# Patient Record
Sex: MALE | Race: WHITE | NOT HISPANIC OR LATINO | Employment: OTHER | URBAN - METROPOLITAN AREA
[De-identification: names, ages, dates, MRNs, and addresses within clinical notes are randomized per-mention and may not be internally consistent; named-entity substitution may affect disease eponyms.]

---

## 2018-10-03 ENCOUNTER — HOSPITAL ENCOUNTER (INPATIENT)
Facility: HOSPITAL | Age: 63
LOS: 5 days | Discharge: HOME | DRG: 897 | End: 2018-10-08
Attending: EMERGENCY MEDICINE | Admitting: INTERNAL MEDICINE
Payer: MEDICAID

## 2018-10-03 DIAGNOSIS — F10.939 ALCOHOL WITHDRAWAL: Primary | ICD-10-CM

## 2018-10-03 DIAGNOSIS — R05.9 COUGH: ICD-10-CM

## 2018-10-03 LAB
ALBUMIN SERPL BCP-MCNC: 3.8 G/DL
ALP SERPL-CCNC: 121 U/L
ALT SERPL W/O P-5'-P-CCNC: 25 U/L
AMPHET+METHAMPHET UR QL: NEGATIVE
ANION GAP SERPL CALC-SCNC: 10 MMOL/L
APAP SERPL-MCNC: <3 UG/ML
AST SERPL-CCNC: 37 U/L
BACTERIA #/AREA URNS AUTO: NORMAL /HPF
BARBITURATES UR QL SCN>200 NG/ML: NEGATIVE
BASOPHILS # BLD AUTO: 0.06 K/UL
BASOPHILS NFR BLD: 0.8 %
BENZODIAZ UR QL SCN>200 NG/ML: NEGATIVE
BILIRUB SERPL-MCNC: 0.6 MG/DL
BILIRUB UR QL STRIP: NEGATIVE
BUN SERPL-MCNC: 9 MG/DL
BZE UR QL SCN: NORMAL
CALCIUM SERPL-MCNC: 9.5 MG/DL
CANNABINOIDS UR QL SCN: NEGATIVE
CHLORIDE SERPL-SCNC: 107 MMOL/L
CLARITY UR REFRACT.AUTO: CLEAR
CO2 SERPL-SCNC: 26 MMOL/L
COLOR UR AUTO: YELLOW
CREAT SERPL-MCNC: 0.7 MG/DL
CREAT UR-MCNC: 49 MG/DL
DIFFERENTIAL METHOD: ABNORMAL
EOSINOPHIL # BLD AUTO: 0.1 K/UL
EOSINOPHIL NFR BLD: 1 %
ERYTHROCYTE [DISTWIDTH] IN BLOOD BY AUTOMATED COUNT: 15 %
EST. GFR  (AFRICAN AMERICAN): >60 ML/MIN/1.73 M^2
EST. GFR  (NON AFRICAN AMERICAN): >60 ML/MIN/1.73 M^2
ETHANOL SERPL-MCNC: 119 MG/DL
ETHANOL SERPL-MCNC: 204 MG/DL
GLUCOSE SERPL-MCNC: 86 MG/DL
GLUCOSE UR QL STRIP: NEGATIVE
HCT VFR BLD AUTO: 38.2 %
HGB BLD-MCNC: 13.3 G/DL
HGB UR QL STRIP: NEGATIVE
HYALINE CASTS UR QL AUTO: 0 /LPF
IMM GRANULOCYTES # BLD AUTO: 0.02 K/UL
IMM GRANULOCYTES NFR BLD AUTO: 0.3 %
KETONES UR QL STRIP: NEGATIVE
LEUKOCYTE ESTERASE UR QL STRIP: NEGATIVE
LYMPHOCYTES # BLD AUTO: 1.4 K/UL
LYMPHOCYTES NFR BLD: 19.2 %
MCH RBC QN AUTO: 31.8 PG
MCHC RBC AUTO-ENTMCNC: 34.8 G/DL
MCV RBC AUTO: 91 FL
METHADONE UR QL SCN>300 NG/ML: NEGATIVE
MICROSCOPIC COMMENT: NORMAL
MONOCYTES # BLD AUTO: 0.6 K/UL
MONOCYTES NFR BLD: 7.9 %
NEUTROPHILS # BLD AUTO: 5.1 K/UL
NEUTROPHILS NFR BLD: 70.8 %
NITRITE UR QL STRIP: NEGATIVE
NRBC BLD-RTO: 0 /100 WBC
OPIATES UR QL SCN: NEGATIVE
PCP UR QL SCN>25 NG/ML: NEGATIVE
PH UR STRIP: 6 [PH] (ref 5–8)
PLATELET # BLD AUTO: 250 K/UL
PMV BLD AUTO: 9.3 FL
POTASSIUM SERPL-SCNC: 3.6 MMOL/L
PROT SERPL-MCNC: 7.7 G/DL
PROT UR QL STRIP: ABNORMAL
RBC # BLD AUTO: 4.18 M/UL
RBC #/AREA URNS AUTO: 0 /HPF (ref 0–4)
SODIUM SERPL-SCNC: 143 MMOL/L
SP GR UR STRIP: 1.01 (ref 1–1.03)
SQUAMOUS #/AREA URNS AUTO: 0 /HPF
TOXICOLOGY INFORMATION: NORMAL
TSH SERPL DL<=0.005 MIU/L-ACNC: 1.47 UIU/ML
URN SPEC COLLECT METH UR: ABNORMAL
UROBILINOGEN UR STRIP-ACNC: NEGATIVE EU/DL
WBC # BLD AUTO: 7.19 K/UL
WBC #/AREA URNS AUTO: 0 /HPF (ref 0–5)

## 2018-10-03 PROCEDURE — 84443 ASSAY THYROID STIM HORMONE: CPT

## 2018-10-03 PROCEDURE — 96365 THER/PROPH/DIAG IV INF INIT: CPT

## 2018-10-03 PROCEDURE — 96366 THER/PROPH/DIAG IV INF ADDON: CPT

## 2018-10-03 PROCEDURE — 80320 DRUG SCREEN QUANTALCOHOLS: CPT | Mod: 91

## 2018-10-03 PROCEDURE — 63600175 PHARM REV CODE 636 W HCPCS: Performed by: STUDENT IN AN ORGANIZED HEALTH CARE EDUCATION/TRAINING PROGRAM

## 2018-10-03 PROCEDURE — 80329 ANALGESICS NON-OPIOID 1 OR 2: CPT

## 2018-10-03 PROCEDURE — 80320 DRUG SCREEN QUANTALCOHOLS: CPT

## 2018-10-03 PROCEDURE — 81001 URINALYSIS AUTO W/SCOPE: CPT

## 2018-10-03 PROCEDURE — 94640 AIRWAY INHALATION TREATMENT: CPT

## 2018-10-03 PROCEDURE — 12000002 HC ACUTE/MED SURGE SEMI-PRIVATE ROOM

## 2018-10-03 PROCEDURE — 99285 EMERGENCY DEPT VISIT HI MDM: CPT | Mod: 25

## 2018-10-03 PROCEDURE — 94761 N-INVAS EAR/PLS OXIMETRY MLT: CPT

## 2018-10-03 PROCEDURE — 99284 EMERGENCY DEPT VISIT MOD MDM: CPT | Mod: ,,, | Performed by: EMERGENCY MEDICINE

## 2018-10-03 PROCEDURE — 80307 DRUG TEST PRSMV CHEM ANLYZR: CPT

## 2018-10-03 PROCEDURE — S4991 NICOTINE PATCH NONLEGEND: HCPCS | Performed by: STUDENT IN AN ORGANIZED HEALTH CARE EDUCATION/TRAINING PROGRAM

## 2018-10-03 PROCEDURE — 25000242 PHARM REV CODE 250 ALT 637 W/ HCPCS: Performed by: STUDENT IN AN ORGANIZED HEALTH CARE EDUCATION/TRAINING PROGRAM

## 2018-10-03 PROCEDURE — 85025 COMPLETE CBC W/AUTO DIFF WBC: CPT

## 2018-10-03 PROCEDURE — 80053 COMPREHEN METABOLIC PANEL: CPT

## 2018-10-03 PROCEDURE — 25000003 PHARM REV CODE 250: Performed by: STUDENT IN AN ORGANIZED HEALTH CARE EDUCATION/TRAINING PROGRAM

## 2018-10-03 RX ORDER — AMOXICILLIN 250 MG
1 CAPSULE ORAL 2 TIMES DAILY
Status: DISCONTINUED | OUTPATIENT
Start: 2018-10-03 | End: 2018-10-08 | Stop reason: HOSPADM

## 2018-10-03 RX ORDER — ONDANSETRON 2 MG/ML
4 INJECTION INTRAMUSCULAR; INTRAVENOUS EVERY 8 HOURS PRN
Status: DISCONTINUED | OUTPATIENT
Start: 2018-10-03 | End: 2018-10-08 | Stop reason: HOSPADM

## 2018-10-03 RX ORDER — CYCLOBENZAPRINE HCL 5 MG
5 TABLET ORAL 3 TIMES DAILY PRN
COMMUNITY

## 2018-10-03 RX ORDER — LOPERAMIDE HYDROCHLORIDE 2 MG/1
4 CAPSULE ORAL ONCE
Status: COMPLETED | OUTPATIENT
Start: 2018-10-03 | End: 2018-10-04

## 2018-10-03 RX ORDER — ONDANSETRON 4 MG/1
8 TABLET, ORALLY DISINTEGRATING ORAL EVERY 8 HOURS PRN
Status: DISCONTINUED | OUTPATIENT
Start: 2018-10-03 | End: 2018-10-08 | Stop reason: HOSPADM

## 2018-10-03 RX ORDER — IBUPROFEN 200 MG
1 TABLET ORAL DAILY
Status: DISCONTINUED | OUTPATIENT
Start: 2018-10-04 | End: 2018-10-03

## 2018-10-03 RX ORDER — IBUPROFEN 800 MG/1
800 TABLET ORAL 3 TIMES DAILY
COMMUNITY

## 2018-10-03 RX ORDER — GABAPENTIN 600 MG/1
600 TABLET ORAL 3 TIMES DAILY
Status: ON HOLD | COMMUNITY
End: 2018-10-08 | Stop reason: SDUPTHER

## 2018-10-03 RX ORDER — THIAMINE HCL 100 MG
100 TABLET ORAL DAILY
Status: DISCONTINUED | OUTPATIENT
Start: 2018-10-04 | End: 2018-10-08 | Stop reason: HOSPADM

## 2018-10-03 RX ORDER — SERTRALINE HYDROCHLORIDE 100 MG/1
100 TABLET, FILM COATED ORAL DAILY
COMMUNITY

## 2018-10-03 RX ORDER — OXCARBAZEPINE 600 MG/1
600 TABLET, FILM COATED ORAL 2 TIMES DAILY
COMMUNITY

## 2018-10-03 RX ORDER — ALBUTEROL SULFATE 0.83 MG/ML
2.5 SOLUTION RESPIRATORY (INHALATION)
Status: COMPLETED | OUTPATIENT
Start: 2018-10-03 | End: 2018-10-03

## 2018-10-03 RX ORDER — SODIUM CHLORIDE 0.9 % (FLUSH) 0.9 %
5 SYRINGE (ML) INJECTION
Status: DISCONTINUED | OUTPATIENT
Start: 2018-10-03 | End: 2018-10-08 | Stop reason: HOSPADM

## 2018-10-03 RX ORDER — OMEPRAZOLE 40 MG/1
40 CAPSULE, DELAYED RELEASE ORAL DAILY
COMMUNITY

## 2018-10-03 RX ORDER — ACETAMINOPHEN 325 MG/1
650 TABLET ORAL EVERY 4 HOURS PRN
Status: DISCONTINUED | OUTPATIENT
Start: 2018-10-03 | End: 2018-10-08 | Stop reason: HOSPADM

## 2018-10-03 RX ORDER — IBUPROFEN 200 MG
16 TABLET ORAL
Status: DISCONTINUED | OUTPATIENT
Start: 2018-10-03 | End: 2018-10-08 | Stop reason: HOSPADM

## 2018-10-03 RX ORDER — AMLODIPINE BESYLATE 5 MG/1
5 TABLET ORAL DAILY
COMMUNITY

## 2018-10-03 RX ORDER — IBUPROFEN 200 MG
1 TABLET ORAL DAILY
Status: DISCONTINUED | OUTPATIENT
Start: 2018-10-03 | End: 2018-10-08 | Stop reason: HOSPADM

## 2018-10-03 RX ORDER — FOLIC ACID 1 MG/1
1 TABLET ORAL DAILY
Status: DISCONTINUED | OUTPATIENT
Start: 2018-10-04 | End: 2018-10-08 | Stop reason: HOSPADM

## 2018-10-03 RX ORDER — GLUCAGON 1 MG
1 KIT INJECTION
Status: DISCONTINUED | OUTPATIENT
Start: 2018-10-03 | End: 2018-10-08 | Stop reason: HOSPADM

## 2018-10-03 RX ORDER — LORAZEPAM 1 MG/1
2 TABLET ORAL EVERY 4 HOURS PRN
Status: DISCONTINUED | OUTPATIENT
Start: 2018-10-03 | End: 2018-10-08

## 2018-10-03 RX ORDER — LISINOPRIL 10 MG/1
10 TABLET ORAL DAILY
Status: ON HOLD | COMMUNITY
End: 2018-10-08 | Stop reason: HOSPADM

## 2018-10-03 RX ORDER — IPRATROPIUM BROMIDE AND ALBUTEROL SULFATE 2.5; .5 MG/3ML; MG/3ML
3 SOLUTION RESPIRATORY (INHALATION) EVERY 6 HOURS PRN
Status: DISCONTINUED | OUTPATIENT
Start: 2018-10-03 | End: 2018-10-08 | Stop reason: HOSPADM

## 2018-10-03 RX ORDER — DIAZEPAM 5 MG/1
5 TABLET ORAL EVERY 8 HOURS
Status: DISCONTINUED | OUTPATIENT
Start: 2018-10-03 | End: 2018-10-05

## 2018-10-03 RX ORDER — RAMELTEON 8 MG/1
8 TABLET ORAL NIGHTLY PRN
Status: DISCONTINUED | OUTPATIENT
Start: 2018-10-03 | End: 2018-10-08 | Stop reason: HOSPADM

## 2018-10-03 RX ORDER — IBUPROFEN 200 MG
24 TABLET ORAL
Status: DISCONTINUED | OUTPATIENT
Start: 2018-10-03 | End: 2018-10-08 | Stop reason: HOSPADM

## 2018-10-03 RX ADMIN — THIAMINE HYDROCHLORIDE 100 MG: 100 INJECTION, SOLUTION INTRAMUSCULAR; INTRAVENOUS at 08:10

## 2018-10-03 RX ADMIN — SODIUM CHLORIDE 500 ML: 0.9 INJECTION, SOLUTION INTRAVENOUS at 08:10

## 2018-10-03 RX ADMIN — NICOTINE 1 PATCH: 14 PATCH, EXTENDED RELEASE TRANSDERMAL at 08:10

## 2018-10-03 RX ADMIN — ALBUTEROL SULFATE 2.5 MG: 2.5 SOLUTION RESPIRATORY (INHALATION) at 07:10

## 2018-10-03 NOTE — ED TRIAGE NOTES
"Patient arrives with  due to "problems with alcohol" requests detox from alcohol. States has been in detox 3 times. Recent emesis and shaking. Drinks 1/5 vodka daily. Drank 1 pint of vodka at 1200 today.   "

## 2018-10-03 NOTE — ED PROVIDER NOTES
Patient is a 63 year old male with PMHX of HTN, depression, alcohol abuse, and hx of SI attempt. He presents to the ED for alcohol intoxication. Patient is accompanied by Ms. Marcia Salazar [phone]  of [facility]. Patient reports having bilateral arms shaking, nausea, vomiting, and abdominal pain. Describes pain as constant and cramping. Rates pain 7/10. Reports last consuming alcohol two hours ago, reports drinking half of pint of vodka. Reports taking 6-600 mg gabapentin and 800 mg ibuprofen with alcohol. Reports drinking to take the pain away. Denies SI, but states there is a possibility of his feelings escalating and he being a danger to himself and others. Hx of alcohol detox three times. Denies SI, HI, or AVH. He denies fever,chills, sob, chest pain, dysuria, diarrhea, or constipation. He is a current everyday smoker and reports excessive alcohol use. Tianna (friend) [phone] and [phone].     I initially evaluated this patient and ordered a workup while in intake.  The patient will receive further evaluation in an ED pod when space is available.  All results from tests ordered in intake will not be followed by the intake team, including myself. All results will be followed by the ED Pod team.    I have discussed and reviewed with my supervising physician.     Codi Joseph PA-C  10/03/18 6236

## 2018-10-03 NOTE — ED NOTES
Patient states he took 6 600 mg Gabapentin at once at 1100 along with Vodka. Also took Ibuprofen 800 mg (4) at 0300. Denies suicidal or homicidal ideations. Denies seizure activity with prior detox.

## 2018-10-03 NOTE — ED NOTES
Patient identifiers verified and correct for Mr Antonio  C/C: Detox from alcohol  APPEARANCE: awake and alert in NAD. Speech garbled and slurred PTA  SKIN: warm, dry and intact. No breakdown or bruising.  MUSCULOSKELETAL: Patient moving all extremities spontaneously, no obvious swelling or deformities noted. Ambulates independently.  RESPIRATORY: Denies shortness of breath.Respirations unlabored.   CARDIAC: Denies CP, 2+ distal pulses; no peripheral edema  ABDOMEN: S/ND/NT, Positive nausea, poor appetite, positive vomiting today  : voids spontaneously, denies difficulty  Neurologic: AAO x 4; follows commands equal strength in all extremities; denies numbness/tingling. Denies dizziness Not able to sleep,

## 2018-10-03 NOTE — ED NOTES
Report received from Chante Dodson RN   Patient is accompanied with sitter for safety at the bedside.   The patient is awake, alert and acting age appropriately. Family at bedside.    No apparent distress noted. Airway is open and patent.  Respirations with normal effort and rate noted. Explanation of care provided to family and patient. No needs at this time. Will continue to monitor.

## 2018-10-04 PROBLEM — Z72.0 TOBACCO ABUSE: Status: ACTIVE | Noted: 2018-10-04

## 2018-10-04 PROBLEM — F32.9 MAJOR DEPRESSIVE DISORDER WITH SINGLE EPISODE: Status: ACTIVE | Noted: 2018-10-04

## 2018-10-04 PROBLEM — I10 HTN (HYPERTENSION): Status: ACTIVE | Noted: 2018-10-04

## 2018-10-04 LAB
BASOPHILS # BLD AUTO: 0.04 K/UL
BASOPHILS NFR BLD: 0.6 %
DIFFERENTIAL METHOD: ABNORMAL
EOSINOPHIL # BLD AUTO: 0.1 K/UL
EOSINOPHIL NFR BLD: 1.2 %
ERYTHROCYTE [DISTWIDTH] IN BLOOD BY AUTOMATED COUNT: 14.6 %
HCT VFR BLD AUTO: 38.8 %
HGB BLD-MCNC: 13.4 G/DL
IMM GRANULOCYTES # BLD AUTO: 0.02 K/UL
IMM GRANULOCYTES NFR BLD AUTO: 0.3 %
LYMPHOCYTES # BLD AUTO: 0.7 K/UL
LYMPHOCYTES NFR BLD: 10.1 %
MCH RBC QN AUTO: 31.5 PG
MCHC RBC AUTO-ENTMCNC: 34.5 G/DL
MCV RBC AUTO: 91 FL
MONOCYTES # BLD AUTO: 0.6 K/UL
MONOCYTES NFR BLD: 9.1 %
NEUTROPHILS # BLD AUTO: 5.5 K/UL
NEUTROPHILS NFR BLD: 78.7 %
NRBC BLD-RTO: 0 /100 WBC
PLATELET # BLD AUTO: 223 K/UL
PMV BLD AUTO: 9.4 FL
RBC # BLD AUTO: 4.26 M/UL
WBC # BLD AUTO: 6.92 K/UL

## 2018-10-04 PROCEDURE — 25000003 PHARM REV CODE 250: Performed by: STUDENT IN AN ORGANIZED HEALTH CARE EDUCATION/TRAINING PROGRAM

## 2018-10-04 PROCEDURE — 85025 COMPLETE CBC W/AUTO DIFF WBC: CPT

## 2018-10-04 PROCEDURE — 99239 HOSP IP/OBS DSCHRG MGMT >30: CPT | Mod: ,,, | Performed by: INTERNAL MEDICINE

## 2018-10-04 PROCEDURE — 36415 COLL VENOUS BLD VENIPUNCTURE: CPT

## 2018-10-04 PROCEDURE — 63600175 PHARM REV CODE 636 W HCPCS: Performed by: STUDENT IN AN ORGANIZED HEALTH CARE EDUCATION/TRAINING PROGRAM

## 2018-10-04 PROCEDURE — 11000001 HC ACUTE MED/SURG PRIVATE ROOM

## 2018-10-04 PROCEDURE — S4991 NICOTINE PATCH NONLEGEND: HCPCS | Performed by: STUDENT IN AN ORGANIZED HEALTH CARE EDUCATION/TRAINING PROGRAM

## 2018-10-04 RX ORDER — MULTIVITAMIN
1 TABLET ORAL DAILY
COMMUNITY

## 2018-10-04 RX ORDER — AMLODIPINE BESYLATE 5 MG/1
5 TABLET ORAL DAILY
Status: DISCONTINUED | OUTPATIENT
Start: 2018-10-04 | End: 2018-10-08 | Stop reason: HOSPADM

## 2018-10-04 RX ORDER — SERTRALINE HYDROCHLORIDE 100 MG/1
100 TABLET, FILM COATED ORAL DAILY
Status: DISCONTINUED | OUTPATIENT
Start: 2018-10-04 | End: 2018-10-08 | Stop reason: HOSPADM

## 2018-10-04 RX ORDER — ENOXAPARIN SODIUM 100 MG/ML
40 INJECTION SUBCUTANEOUS EVERY 24 HOURS
Status: DISCONTINUED | OUTPATIENT
Start: 2018-10-04 | End: 2018-10-08 | Stop reason: HOSPADM

## 2018-10-04 RX ORDER — BENZONATATE 100 MG/1
100 CAPSULE ORAL 3 TIMES DAILY PRN
Status: DISCONTINUED | OUTPATIENT
Start: 2018-10-04 | End: 2018-10-08 | Stop reason: HOSPADM

## 2018-10-04 RX ORDER — LISINOPRIL 10 MG/1
10 TABLET ORAL DAILY
Status: DISCONTINUED | OUTPATIENT
Start: 2018-10-04 | End: 2018-10-04

## 2018-10-04 RX ORDER — FLUTICASONE PROPIONATE 50 MCG
2 SPRAY, SUSPENSION (ML) NASAL DAILY
Status: DISCONTINUED | OUTPATIENT
Start: 2018-10-05 | End: 2018-10-08 | Stop reason: HOSPADM

## 2018-10-04 RX ORDER — LISINOPRIL 20 MG/1
20 TABLET ORAL DAILY
Status: DISCONTINUED | OUTPATIENT
Start: 2018-10-05 | End: 2018-10-06

## 2018-10-04 RX ORDER — AMLODIPINE BESYLATE 5 MG/1
5 TABLET ORAL DAILY
Status: DISCONTINUED | OUTPATIENT
Start: 2018-10-04 | End: 2018-10-04

## 2018-10-04 RX ORDER — ALBUTEROL SULFATE 90 UG/1
2 AEROSOL, METERED RESPIRATORY (INHALATION) EVERY 6 HOURS PRN
COMMUNITY

## 2018-10-04 RX ORDER — LISINOPRIL 10 MG/1
10 TABLET ORAL DAILY
Status: COMPLETED | OUTPATIENT
Start: 2018-10-04 | End: 2018-10-04

## 2018-10-04 RX ADMIN — Medication 100 MG: at 09:10

## 2018-10-04 RX ADMIN — ACETAMINOPHEN 650 MG: 325 TABLET ORAL at 08:10

## 2018-10-04 RX ADMIN — DIAZEPAM 5 MG: 5 TABLET ORAL at 05:10

## 2018-10-04 RX ADMIN — LISINOPRIL 10 MG: 10 TABLET ORAL at 06:10

## 2018-10-04 RX ADMIN — NICOTINE 1 PATCH: 14 PATCH, EXTENDED RELEASE TRANSDERMAL at 08:10

## 2018-10-04 RX ADMIN — ENOXAPARIN SODIUM 40 MG: 100 INJECTION SUBCUTANEOUS at 05:10

## 2018-10-04 RX ADMIN — LOPERAMIDE HYDROCHLORIDE 4 MG: 2 CAPSULE ORAL at 12:10

## 2018-10-04 RX ADMIN — DIAZEPAM 5 MG: 5 TABLET ORAL at 01:10

## 2018-10-04 RX ADMIN — DIAZEPAM 5 MG: 5 TABLET ORAL at 12:10

## 2018-10-04 RX ADMIN — LISINOPRIL 10 MG: 10 TABLET ORAL at 02:10

## 2018-10-04 RX ADMIN — FOLIC ACID 1 MG: 1 TABLET ORAL at 09:10

## 2018-10-04 RX ADMIN — THERA TABS 1 TABLET: TAB at 09:10

## 2018-10-04 RX ADMIN — IBUPROFEN 600 MG: 200 TABLET, FILM COATED ORAL at 04:10

## 2018-10-04 RX ADMIN — Medication 1 TABLET: at 09:10

## 2018-10-04 RX ADMIN — SENNOSIDES AND DOCUSATE SODIUM 1 TABLET: 8.6; 5 TABLET ORAL at 12:10

## 2018-10-04 RX ADMIN — SERTRALINE 100 MG: 100 TABLET, FILM COATED ORAL at 09:10

## 2018-10-04 RX ADMIN — AMLODIPINE BESYLATE 5 MG: 5 TABLET ORAL at 06:10

## 2018-10-04 RX ADMIN — SENNOSIDES AND DOCUSATE SODIUM 1 TABLET: 8.6; 5 TABLET ORAL at 10:10

## 2018-10-04 RX ADMIN — LORAZEPAM 2 MG: 1 TABLET ORAL at 08:10

## 2018-10-04 RX ADMIN — LORAZEPAM 2 MG: 1 TABLET ORAL at 03:10

## 2018-10-04 RX ADMIN — ACETAMINOPHEN 650 MG: 325 TABLET ORAL at 03:10

## 2018-10-04 RX ADMIN — SENNOSIDES AND DOCUSATE SODIUM 1 TABLET: 8.6; 5 TABLET ORAL at 09:10

## 2018-10-04 RX ADMIN — DIAZEPAM 5 MG: 5 TABLET ORAL at 10:10

## 2018-10-04 RX ADMIN — BENZONATATE 100 MG: 100 CAPSULE ORAL at 06:10

## 2018-10-04 NOTE — ED PROVIDER NOTES
Encounter Date: 10/3/2018       History     Chief Complaint   Patient presents with    Alcohol Intoxication     PT brought in by therapist. Pt brought in for detox and drank 1 pint today. PT has been shaking and vomiting.      Mr. Antonio is a 62 y/o M with PMHx of depression, HTN and prior SI who presents today requesting assistance detoxing from alcohol. He drank 1/2 of a fifth of vodka 2 hours prior to being seen in the ED. He also took 6 of his gabapentin and 4 ibuprofen pills for his neck pain. He denies attempting to harm himself by taking these pills. He states it did help his pain. He is complaning of headache and N/V. He states he gets GI upset, sweating, shakes when he tries to stop drinking. He has a hx of alcohol abuse, but has maintained sobriety for a period of years. He started drinking again about 2 weeks ago due to social stressors. He is now drinking to prevent detox s/s. He denies SI, HI, AH, VH.       The history is provided by the patient and medical records.     Review of patient's allergies indicates:  No Known Allergies  Past Medical History:   Diagnosis Date    Behavioral problem     Depression     Fatigue     History of psychiatric care     History of psychiatric hospitalization     Hypertension     Self-harming behavior     Suicide attempt      Past Surgical History:   Procedure Laterality Date    ABDOMINAL SURGERY       Family History   Problem Relation Age of Onset    Alcohol abuse Brother     Drug abuse Brother      Social History     Tobacco Use    Smoking status: Current Every Day Smoker     Packs/day: 1.00     Types: Cigarettes    Smokeless tobacco: Never Used   Substance Use Topics    Alcohol use: Yes     Alcohol/week: 7.0 oz     Types: 14 Standard drinks or equivalent per week     Comment: 5th of vodka daily     Drug use: No     Review of Systems   Constitutional: Positive for appetite change and diaphoresis. Negative for chills and fever.   HENT: Negative.          Pt has hoarse voice and slurred speech, but per  who accompanied pt to ED, this is pt's baseline.    Eyes: Negative.    Respiratory: Positive for cough and wheezing. Negative for choking, chest tightness, shortness of breath and stridor.    Cardiovascular: Negative for chest pain, palpitations and leg swelling.   Gastrointestinal: Positive for nausea and vomiting. Negative for abdominal distention, abdominal pain, constipation and diarrhea.   Endocrine: Negative.    Genitourinary: Negative.    Musculoskeletal: Negative.    Skin: Negative.    Allergic/Immunologic: Negative.    Neurological: Positive for tremors and headaches. Negative for dizziness, seizures, syncope, speech difficulty, weakness, light-headedness and numbness.   Hematological: Negative.    Psychiatric/Behavioral: Positive for sleep disturbance. Negative for agitation, behavioral problems, confusion, decreased concentration, dysphoric mood, hallucinations, self-injury and suicidal ideas. The patient is not nervous/anxious and is not hyperactive.        Physical Exam     Initial Vitals [10/03/18 1533]   BP Pulse Resp Temp SpO2   (!) 157/86 94 20 98.6 °F (37 °C) 95 %      MAP       --         Physical Exam    Nursing note and vitals reviewed.  Constitutional: He appears well-developed and well-nourished. He is not diaphoretic. No distress.   HENT:   Head: Normocephalic and atraumatic.   Eyes: Conjunctivae and EOM are normal. Pupils are equal, round, and reactive to light.   Neck: Normal range of motion. Neck supple.   Cardiovascular: Normal rate, regular rhythm, normal heart sounds and intact distal pulses.   Pulmonary/Chest: He has wheezes.   Abdominal: Soft. Bowel sounds are normal.   Musculoskeletal: Normal range of motion.   Neurological: He is alert and oriented to person, place, and time. He has normal strength. He displays tremor. GCS score is 15. GCS eye subscore is 4. GCS verbal subscore is 5. GCS motor subscore is 6.   Skin: Skin  "is warm and dry.   Psychiatric: He has a normal mood and affect. His behavior is normal. Judgment and thought content normal.         ED Course   Procedures  Labs Reviewed   CBC W/ AUTO DIFFERENTIAL - Abnormal; Notable for the following components:       Result Value    RBC 4.18 (*)     Hemoglobin 13.3 (*)     Hematocrit 38.2 (*)     MCH 31.8 (*)     RDW 15.0 (*)     All other components within normal limits    Narrative:     red used as one green    URINALYSIS, REFLEX TO URINE CULTURE - Abnormal; Notable for the following components:    Protein, UA 2+ (*)     All other components within normal limits    Narrative:     Preferred Collection Type->Urine, Clean Catch   ALCOHOL,MEDICAL (ETHANOL) - Abnormal; Notable for the following components:    Alcohol, Medical, Serum 204 (*)     All other components within normal limits    Narrative:     red used as one green    ACETAMINOPHEN LEVEL - Abnormal; Notable for the following components:    Acetaminophen (Tylenol), Serum <3.0 (*)     All other components within normal limits    Narrative:     red used as one green    ALCOHOL,MEDICAL (ETHANOL) - Abnormal; Notable for the following components:    Alcohol, Medical, Serum 119 (*)     All other components within normal limits   COMPREHENSIVE METABOLIC PANEL    Narrative:     red used as one green    TSH    Narrative:     red used as one green    ALCOHOL,MEDICAL (ETHANOL)   URINALYSIS MICROSCOPIC    Narrative:     Preferred Collection Type->Urine, Clean Catch   DRUG SCREEN PANEL, URINE EMERGENCY          Imaging Results          X-Ray Chest PA And Lateral (Final result)  Result time 10/03/18 21:02:32    Final result by Ricardo Weaver MD (10/03/18 21:02:32)                 Impression:      No acute cardiopulmonary finding.      Electronically signed by: Ricardo Weaver MD  Date:    10/03/2018  Time:    21:02             Narrative:    EXAMINATION:  XR CHEST PA AND LATERAL    CLINICAL HISTORY:  Provided history is "  " "Cough".    TECHNIQUE:  Frontal and lateral views of the chest were performed.    COMPARISON:  07/23/2013.    FINDINGS:  Cardiac silhouette is not enlarged. No focal consolidation.  No sizable pleural effusion.  No pneumothorax.  There are several old left-sided rib fractures.                                 Medical Decision Making:   Initial Assessment:   Mr. Antonio is a 62 y/o M with PMHx of depression, HTN and prior SI who presents today requesting assistance detoxing from alcohol. He drank 1/2 of a fifth of vodka 2 hours prior to being seen in the ED. He also took 6 of his gabapentin and 4 ibuprofen pills for his neck pain. He denies attempting to harm himself by taking these pills. He states it did help his pain. He is complaning of headache and N/V. He states he gets GI upset, sweating, shakes when he tries to stop drinking. He has a hx of alcohol abuse, but has maintained sobriety for a period of years. He started drinking again about 2 weeks ago due to social stressors. He is now drinking to prevent detox s/s. He denies SI, HI, AH, VH.   ED Management:  EtOH levels, CBC, CMP, EKG, thiamine and B12, 1L NS. Admit to hospital medicine.       APC / Resident Notes:   10:12 PM  Pt's CIWA is 12. Pt will be admitted to Dr. Sommer's service.         Attending Attestation:   Physician Attestation Statement for Resident:  As the supervising MD   Physician Attestation Statement: I have personally seen and examined this patient.   I agree with the above history. -:   As the supervising MD I agree with the above PE.    As the supervising MD I agree with the above treatment, course, plan, and disposition.                       Clinical Impression:   The primary encounter diagnosis was Alcohol withdrawal. A diagnosis of Cough was also pertinent to this visit.                             Suman Zamora MD  11/28/18 0817    "

## 2018-10-04 NOTE — SUBJECTIVE & OBJECTIVE
Interval History: Admitted overnight for ETOH withdrawal. Only required ativan 2mg PO x1 per CIWA.    Review of Systems  Objective:     Vital Signs (Most Recent):  Temp: 98 °F (36.7 °C) (10/04/18 1553)  Pulse: 99 (10/04/18 1607)  Resp: 20 (10/04/18 1553)  BP: (!) 164/90 (10/04/18 1553)  SpO2: 97 % (10/04/18 1553) Vital Signs (24h Range):  Temp:  [96.7 °F (35.9 °C)-98.6 °F (37 °C)] 98 °F (36.7 °C)  Pulse:  [72-99] 99  Resp:  [18-20] 20  SpO2:  [93 %-97 %] 97 %  BP: (164-182)/() 164/90     Weight: 71.7 kg (158 lb)  Body mass index is 22.67 kg/m².    Intake/Output Summary (Last 24 hours) at 10/4/2018 1853  Last data filed at 10/4/2018 1415  Gross per 24 hour   Intake 1200 ml   Output --   Net 1200 ml      Physical Exam   Constitutional: He is oriented to person, place, and time. He appears well-developed and well-nourished. No distress.   HENT:   Head: Normocephalic and atraumatic.   Eyes: Conjunctivae and EOM are normal. No scleral icterus.   Neck: Normal range of motion. Neck supple.   Cardiovascular: Normal rate, regular rhythm and normal heart sounds.   Pulmonary/Chest: Effort normal. He has no wheezes. He has no rales.   Abdominal: Soft. Bowel sounds are normal. He exhibits no distension. There is no tenderness.   Musculoskeletal: Normal range of motion. He exhibits no edema or tenderness.   Neurological: He is alert and oriented to person, place, and time.   Very mild tremor noted   Skin: Skin is warm and dry. No rash noted.   Psychiatric: He has a normal mood and affect. His behavior is normal.       Significant Labs:   CBC:   Recent Labs   Lab  10/03/18   1715  10/04/18   1201   WBC  7.19  6.92   HGB  13.3*  13.4*   HCT  38.2*  38.8*   PLT  250  223     CMP:   Recent Labs   Lab  10/03/18   1715   NA  143   K  3.6   CL  107   CO2  26   GLU  86   BUN  9   CREATININE  0.7   CALCIUM  9.5   PROT  7.7   ALBUMIN  3.8   BILITOT  0.6   ALKPHOS  121   AST  37   ALT  25   ANIONGAP  10   EGFRNONAA  >60.0        Significant Imaging: I have reviewed and interpreted all pertinent imaging results/findings within the past 24 hours.

## 2018-10-04 NOTE — ASSESSMENT & PLAN NOTE
-- Valium 5 mg Q8  -- Ativan with CIWA >8  -- Thiamin, folic acid multivitamin supplement daily.  -- Consider addiction psych consult in the morning.

## 2018-10-04 NOTE — PLAN OF CARE
Dajuan Sears MD     Payor: MEDICAID / Plan: [place] [employer] / Product Type: Managed Medicaid /      Extended Emergency Contact Information  Primary Emergency Contact: Akosua Gutierrez  Address: [facility] [place] [address] [facility]  Home Phone: [phone]  Relation: Friend        10/04/18 1303   Discharge Assessment   Assessment Type Discharge Planning Assessment   Confirmed/corrected address and phone number on facesheet? Yes   Assessment information obtained from? Patient   Expected Length of Stay (days) 3   Communicated expected length of stay with patient/caregiver yes   Prior to hospitilization cognitive status: Alert/Oriented   Prior to hospitalization functional status: Independent   Current cognitive status: Alert/Oriented   Current Functional Status: Needs Assistance   Able to Return to Prior Arrangements yes   Is patient able to care for self after discharge? Yes   Patient's perception of discharge disposition home or selfcare   Readmission Within The Last 30 Days no previous admission in last 30 days   Patient currently being followed by outpatient case management? No   Patient currently receives any other outside agency services? No   Equipment Currently Used at Home none   Do you have any problems affording any of your prescribed medications? No   Is the patient taking medications as prescribed? yes   Does the patient have transportation home? Yes   Transportation Available family or friend will provide   Does the patient receive services at the [facility]? No   Discharge Plan A Home   Discharge Plan B Home with family   Patient/Family In Agreement With Plan yes

## 2018-10-04 NOTE — SUBJECTIVE & OBJECTIVE
Past Medical History:   Diagnosis Date    Behavioral problem     Depression     Fatigue     History of psychiatric care     History of psychiatric hospitalization     Hypertension     Self-harming behavior     Suicide attempt        Past Surgical History:   Procedure Laterality Date    ABDOMINAL SURGERY         Review of patient's allergies indicates:  No Known Allergies    No current facility-administered medications on file prior to encounter.      Current Outpatient Medications on File Prior to Encounter   Medication Sig    amLODIPine (NORVASC) 5 MG tablet Take 5 mg by mouth once daily.    cyclobenzaprine (FLEXERIL) 5 MG tablet Take 5 mg by mouth 3 (three) times daily as needed for Muscle spasms.    gabapentin (NEURONTIN) 600 MG tablet Take 600 mg by mouth 3 (three) times daily.    ibuprofen (ADVIL,MOTRIN) 800 MG tablet Take 800 mg by mouth 3 (three) times daily.    lisinopril 10 MG tablet Take 10 mg by mouth once daily.    omeprazole (PRILOSEC) 40 MG capsule Take 40 mg by mouth once daily.    OXcarbazepine (TRILEPTAL) 600 MG Tab Take 150 mg by mouth 2 (two) times daily.    sertraline (ZOLOFT) 100 MG tablet Take 100 mg by mouth once daily.    pantoprazole (PROTONIX) 40 MG tablet Take 1 tablet (40 mg total) by mouth once daily.    propranolol (INDERAL) 20 MG tablet Take 1 tablet (20 mg total) by mouth 2 (two) times daily.     Family History     Problem Relation (Age of Onset)    Alcohol abuse Brother    Drug abuse Brother        Tobacco Use    Smoking status: Current Every Day Smoker     Packs/day: 1.00     Types: Cigarettes    Smokeless tobacco: Never Used   Substance and Sexual Activity    Alcohol use: Yes     Alcohol/week: 7.0 oz     Types: 14 Standard drinks or equivalent per week     Comment: 5th of vodka daily     Drug use: No    Sexual activity: Not Currently     Review of Systems   Constitutional: Negative for chills and fever.   HENT: Negative for congestion.    Eyes: Negative for  visual disturbance.   Respiratory: Positive for cough and shortness of breath.    Cardiovascular: Negative for chest pain, palpitations and leg swelling.   Gastrointestinal: Negative for abdominal distention, abdominal pain, diarrhea, nausea and vomiting.   Genitourinary: Negative for dysuria and hematuria.   Neurological: Negative for seizures, syncope and headaches.   Psychiatric/Behavioral: Negative for agitation and behavioral problems.     Objective:     Vital Signs (Most Recent):  Temp: 98.6 °F (37 °C) (10/04/18 0300)  Pulse: 72 (10/04/18 0300)  Resp: 20 (10/04/18 0300)  BP: (!) 182/100 (10/04/18 0300)  SpO2: 95 % (10/04/18 0300) Vital Signs (24h Range):  Temp:  [98.6 °F (37 °C)] 98.6 °F (37 °C)  Pulse:  [72-94] 72  Resp:  [20] 20  SpO2:  [94 %-95 %] 95 %  BP: (157-182)/() 182/100     Weight: 71.7 kg (158 lb)  Body mass index is 22.67 kg/m².    Physical Exam   Constitutional: He is oriented to person, place, and time. He appears well-developed and well-nourished.   HENT:   Head: Normocephalic and atraumatic.   Neck: Normal range of motion. Neck supple.   Cardiovascular: Normal rate, regular rhythm and normal heart sounds.   Pulmonary/Chest: Effort normal.   Abdominal: Soft. Bowel sounds are normal.   Musculoskeletal: He exhibits no edema.   Neurological: He is alert and oriented to person, place, and time.   Skin: Skin is warm and dry.   Psychiatric: He has a normal mood and affect. His behavior is normal.           Significant Labs: All pertinent labs within the past 24 hours have been reviewed.    Significant Imaging: I have reviewed all pertinent imaging results/findings within the past 24 hours.

## 2018-10-04 NOTE — PROGRESS NOTES
Pt admitted from ER in stretcher accompanied by 1 transporting staff in stable condition. AAOx4, calm and cooperative. Oriented to the room and the unit.

## 2018-10-04 NOTE — PLAN OF CARE
Problem: Fall Risk (Adult)  Goal: Identify Related Risk Factors and Signs and Symptoms  Related risk factors and signs and symptoms are identified upon initiation of Human Response Clinical Practice Guideline (CPG)  Outcome: Ongoing (interventions implemented as appropriate)   10/04/18 1645   Fall Risk   Related Risk Factors (Fall Risk) fatigue/slow reaction;homeostatic imbalance;polypharmacy;environment unfamiliar   Signs and Symptoms (Fall Risk) presence of risk factors     Goal: Absence of Falls  Patient will demonstrate the desired outcomes by discharge/transition of care.  Outcome: Ongoing (interventions implemented as appropriate)   10/04/18 1645   Fall Risk (Adult)   Absence of Falls making progress toward outcome

## 2018-10-04 NOTE — PROGRESS NOTES
[facility] Hospital Medicine  Progress Note    Patient Name: Giuliano Antonio  MRN: [id number]  Patient Class: IP- Inpatient   Admission Date: 10/3/2018  Length of Stay: 1 days  Attending Physician: Shona Sommer MD  Primary Care Provider: Dajuan Sears MD    [facility] Medicine Team: [facility]OSP MED 5 Phi Anthony MD    Subjective:     Principal Problem:Alcohol withdrawal    HPI:  Mr. Antonio is a 62 y/o M with PMHx of depression, HTN and prior SI who presents today requesting assistance detoxing from alcohol.     He drank 1/2 of a fifth of vodka 2 hours prior to being seen in the ED. He also took 6 of his gabapentin and 4 ibuprofen pills for his neck pain. He denies attempting to harm himself by taking these pills. He states it did help his pain. He is complaning of headache and N/V. He states he gets GI upset, sweating, shakes when he tries to stop drinking. He has a hx of alcohol abuse, but has maintained sobriety for a period of years. He started drinking again about 2 weeks ago due to social stressors. He is now drinking to prevent detox s/s. He denies SI, HI, AH, VH.         Hospital Course:  Pt was admitted to hospital medicine 10/03 for alcohol withdrawal.    Interval History: Admitted overnight for ETOH withdrawal. Only required ativan 2mg PO x1 per CIWA.    Review of Systems  Objective:     Vital Signs (Most Recent):  Temp: 98 °F (36.7 °C) (10/04/18 1553)  Pulse: 99 (10/04/18 1607)  Resp: 20 (10/04/18 1553)  BP: (!) 164/90 (10/04/18 1553)  SpO2: 97 % (10/04/18 1553) Vital Signs (24h Range):  Temp:  [96.7 °F (35.9 °C)-98.6 °F (37 °C)] 98 °F (36.7 °C)  Pulse:  [72-99] 99  Resp:  [18-20] 20  SpO2:  [93 %-97 %] 97 %  BP: (164-182)/() 164/90     Weight: 71.7 kg (158 lb)  Body mass index is 22.67 kg/m².    Intake/Output Summary (Last 24 hours) at 10/4/2018 4255  Last data filed at 10/4/2018 1415  Gross per 24 hour   Intake 1200 ml   Output --   Net 1200 ml      Physical  Exam   Constitutional: He is oriented to person, place, and time. He appears well-developed and well-nourished. No distress.   HENT:   Head: Normocephalic and atraumatic.   Eyes: Conjunctivae and EOM are normal. No scleral icterus.   Neck: Normal range of motion. Neck supple.   Cardiovascular: Normal rate, regular rhythm and normal heart sounds.   Pulmonary/Chest: Effort normal. He has no wheezes. He has no rales.   Abdominal: Soft. Bowel sounds are normal. He exhibits no distension. There is no tenderness.   Musculoskeletal: Normal range of motion. He exhibits no edema or tenderness.   Neurological: He is alert and oriented to person, place, and time.   Very mild tremor noted   Skin: Skin is warm and dry. No rash noted.   Psychiatric: He has a normal mood and affect. His behavior is normal.       Significant Labs:   CBC:   Recent Labs   Lab  10/03/18   1715  10/04/18   1201   WBC  7.19  6.92   HGB  13.3*  13.4*   HCT  38.2*  38.8*   PLT  250  223     CMP:   Recent Labs   Lab  10/03/18   1715   NA  143   K  3.6   CL  107   CO2  26   GLU  86   BUN  9   CREATININE  0.7   CALCIUM  9.5   PROT  7.7   ALBUMIN  3.8   BILITOT  0.6   ALKPHOS  121   AST  37   ALT  25   ANIONGAP  10   EGFRNONAA  >60.0       Significant Imaging: I have reviewed and interpreted all pertinent imaging results/findings within the past 24 hours.    Assessment/Plan:      * Alcohol withdrawal    -- Valium 5 mg Q8  -- Ativan with CIWA >8  -- Thiamin, folic acid multivitamin supplement daily          Tobacco abuse    -- Nicotine patch PRN.          Major depressive disorder with single episode    -- Hx of SI 1 year ago.  -- Denies any SI/HI  -- Continue home zoloft 100 mg daily.          HTN (hypertension)    Home lisinopril increased from 10 mg to 20mg daily   Continue home amlodipine 5 mg daily              VTE Risk Mitigation (From admission, onward)        Ordered     enoxaparin injection 40 mg  Daily      10/04/18 0424     Place sequential  compression device  Until discontinued      10/03/18 2147     IP VTE LOW RISK PATIENT  Once      10/03/18 2147              Phi Anthony MD  Department of Hospital Medicine   [facility]

## 2018-10-04 NOTE — MEDICAL/APP STUDENT
HISTORY & PHYSICAL  Hospital Medicine    [place]: [facility]    PRESENTING HISTORY     Chief Complaint/Reason for Admission:  Alcohol withdrawal    History of Present Illness:  Mr. Giuliano Antonio is a 63 y.o. male  with PMHx of depression, HTN and prior SI and alcohol detox x3, who presented last night requesting assistance detoxing from alcohol. BIB  at [facility], reports having bilateral arm shaking, N/V/abdo pain. Constant 7/10.     He drank 1/2 of a fifth of vodka 2 hours prior to being seen in the ED. He also took 6 600mg of his gabapentin and 4 ibuprofen pills for his neck pain. He denies attempting to harm himself by taking these pills. He states it did help his pain.    This morning on exam, he is hypersomnolent, reports headache, chills, diaphoresis, shaking, and paresthesias. Pt denies LOC, N/V/D, SOB, CXP, dysuria.     Denies SI, HI, AVH but reported to ED PA last night that there is a possibility of his feelings escalating and he being a danger to himself and others, PECd.      Pt reports feeling scared and anxious.     Review of Systems:  Constitutional: positive for chills, fatigue and sweats  Respiratory: cough and shortness of breath  Cardiovascular: no chest pain or palpitations  Gastrointestinal: no nausea or vomiting, no abdominal pain or change in bowel habits  Musculoskeletal: positive for arthralgias, myalgias and LLE bilaterally  Neurological: positive for paresthesia and tremors  Behavioral/Psych: no auditory or visual hallucinations    PAST HISTORY:     Past Medical History:   Diagnosis Date    Behavioral problem     Depression     Fatigue     History of psychiatric care     History of psychiatric hospitalization     Hypertension     Self-harming behavior     Suicide attempt        Past Surgical History:   Procedure Laterality Date    ABDOMINAL SURGERY         Family History   Problem Relation Age of Onset    Alcohol abuse Brother      Drug abuse Brother        Social History     Socioeconomic History    Marital status:      Spouse name: None    Number of children: None    Years of education: None    Highest education level: None   Social Needs    Financial resource strain: None    Food insecurity - worry: None    Food insecurity - inability: None    Transportation needs - medical: None    Transportation needs - non-medical: None   Occupational History    None   Tobacco Use    Smoking status: Current Every Day Smoker     Packs/day: 1.00- 49 pack year history     Types: Cigarettes    Smokeless tobacco: Never Used   Substance and Sexual Activity    Alcohol use: Yes     Alcohol/week: 7.0 oz     Types: 14 Standard drinks or equivalent per week     Comment: 5th of vodka daily     Drug use: IV crack coaine    Sexual activity: Not Currently   Other Topics Concern    Patient feels they ought to cut down on drinking/drug use Yes    Patient annoyed by others criticizing their drinking/drug use Yes    Patient has felt bad or guilty about drinking/drug use Yes    Patient has had a drink/used drugs as an eye opener in the AM Yes   Social History Narrative    None       MEDICATIONS & ALLERGIES:     No current facility-administered medications on file prior to encounter.      Current Outpatient Medications on File Prior to Encounter   Medication Sig Dispense Refill    amLODIPine (NORVASC) 5 MG tablet Take 5 mg by mouth once daily.      cyclobenzaprine (FLEXERIL) 5 MG tablet Take 5 mg by mouth 3 (three) times daily as needed for Muscle spasms.      gabapentin (NEURONTIN) 600 MG tablet Take 600 mg by mouth 3 (three) times daily.      ibuprofen (ADVIL,MOTRIN) 800 MG tablet Take 800 mg by mouth 3 (three) times daily.      lisinopril 10 MG tablet Take 10 mg by mouth once daily.      omeprazole (PRILOSEC) 40 MG capsule Take 40 mg by mouth once daily.      OXcarbazepine (TRILEPTAL) 600 MG Tab Take 150 mg by mouth 2 (two) times  daily.      sertraline (ZOLOFT) 100 MG tablet Take 100 mg by mouth once daily.      pantoprazole (PROTONIX) 40 MG tablet Take 1 tablet (40 mg total) by mouth once daily. 30 tablet 2    propranolol (INDERAL) 20 MG tablet Take 1 tablet (20 mg total) by mouth 2 (two) times daily. 60 tablet 1        Review of patient's allergies indicates:  No Known Allergies    OBJECTIVE:     Vital Signs:  Temp:  [98.5 °F (36.9 °C)-98.6 °F (37 °C)] 98.5 °F (36.9 °C)  Pulse:  [72-94] 78  Resp:  [18-20] 18  SpO2:  [94 %-96 %] 96 %  BP: (157-182)/() 168/105, 182/107, 182/100  Body mass index is 22.67 kg/m².     Physical Exam:  General: well developed, appears older than stated age, no distress, hypersomnolent, oriented x3, multiple tattoos on arms and chest, midline abdominal scar, LLQ abdominal scar  Lungs:  rales RLL and RUL  Cardiovascular: Heart: regular rate and rhythm and possible diastolic murmur?. Chest Wall: no tenderness. 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 Pulses: 2+ and symmetric.  Abdomen/Rectal: Abdomen: soft, non-tender non-distented; bowel sounds normal; no masses,  no organomegaly. Rectal: not examined  Musculoskeletal:not  Neurologic: Normal strength and tone. No focal numbness or weakness  Mental status: alertness: lethargic, affect: congruent and appropriate, anxious  Psych/Behavioral:    (+ for mild anxiety, mild paresthesias, very mild headache)  CIWA-Ar: 4     Laboratory  Lab Results   Component Value Date    WBC 7.19 10/03/2018    HGB 13.3 (L)  13.4 10/03/2018    HCT 38.2 (L)   37.9 10/03/2018    MCV 91 10/03/2018     10/03/2018     Recent Labs   Lab  10/03/18   1715   GLU  86   NA  143   K  3.6   CL  107   CO2  26   BUN  9   CREATININE  0.7   CALCIUM  9.5   EtOH 119 from 204  Drug screen: + benzos, otherwise neg  Urinalysis: 2+ protein  Acetaminophen level: <3    Imaging: CXR no acute cardiopulmonary findings    Lab Results   Component Value Date     INR 0.9 07/18/2013    INR 1.0 07/17/2013     No results found for: HGBA1C  No results for input(s): POCTGLUCOSE in the last 72 hours.    Diagnostic Results:  Labs: Reviewed    ASSESSMENT & PLAN:   Mr. Antonio 64yo M here for alcohol withdrawal     Alcohol withdrawal  - CIWA-Ar q8H, currently 4; score >8 requires ativan  - received 2mg ativan at 0339 this morning  - 5mg diazepam q8h  - 100mg thiamine  - thiamine/ folate/B6   - SC bolus x2 10/03, encourage oral intake fluid  - psych consult    MDD  - sertraline 100mg daily    HTN  - 5mg amlodipine    Tobacco use  - nicotine patch PRN    Leeann Bird   UVENKATESHO- MS3  IM Team 5

## 2018-10-04 NOTE — H&P
[facility] Hospital Medicine  History & Physical    Patient Name: Giuliano Antonio  MRN: [id number]  Admission Date: 10/3/2018  Attending Physician: Shona Sommer MD   Primary Care Provider: Dajuan Sears MD    [facility] Medicine Team: [facility] Artemio Triplett MD     Patient information was obtained from patient, past medical records and ER records.     Subjective:     Principal Problem:Alcohol withdrawal    Chief Complaint:   Chief Complaint   Patient presents with    Alcohol Intoxication     PT brought in by therapist. Pt brought in for detox and drank 1 pint today. PT has been shaking and vomiting.         HPI: Mr. Antonio is a 64 y/o M with PMHx of depression, HTN and prior SI who presents today requesting assistance detoxing from alcohol.     He drank 1/2 of a fifth of vodka 2 hours prior to being seen in the ED. He also took 6 of his gabapentin and 4 ibuprofen pills for his neck pain. He denies attempting to harm himself by taking these pills. He states it did help his pain. He is complaning of headache and N/V. He states he gets GI upset, sweating, shakes when he tries to stop drinking. He has a hx of alcohol abuse, but has maintained sobriety for a period of years. He started drinking again about 2 weeks ago due to social stressors. He is now drinking to prevent detox s/s. He denies SI, HI, AH, VH.         Past Medical History:   Diagnosis Date    Behavioral problem     Depression     Fatigue     History of psychiatric care     History of psychiatric hospitalization     Hypertension     Self-harming behavior     Suicide attempt        Past Surgical History:   Procedure Laterality Date    ABDOMINAL SURGERY         Review of patient's allergies indicates:  No Known Allergies    No current facility-administered medications on file prior to encounter.      Current Outpatient Medications on File Prior to Encounter   Medication Sig    amLODIPine (NORVASC) 5 MG  tablet Take 5 mg by mouth once daily.    cyclobenzaprine (FLEXERIL) 5 MG tablet Take 5 mg by mouth 3 (three) times daily as needed for Muscle spasms.    gabapentin (NEURONTIN) 600 MG tablet Take 600 mg by mouth 3 (three) times daily.    ibuprofen (ADVIL,MOTRIN) 800 MG tablet Take 800 mg by mouth 3 (three) times daily.    lisinopril 10 MG tablet Take 10 mg by mouth once daily.    omeprazole (PRILOSEC) 40 MG capsule Take 40 mg by mouth once daily.    OXcarbazepine (TRILEPTAL) 600 MG Tab Take 150 mg by mouth 2 (two) times daily.    sertraline (ZOLOFT) 100 MG tablet Take 100 mg by mouth once daily.    pantoprazole (PROTONIX) 40 MG tablet Take 1 tablet (40 mg total) by mouth once daily.    propranolol (INDERAL) 20 MG tablet Take 1 tablet (20 mg total) by mouth 2 (two) times daily.     Family History     Problem Relation (Age of Onset)    Alcohol abuse Brother    Drug abuse Brother        Tobacco Use    Smoking status: Current Every Day Smoker     Packs/day: 1.00     Types: Cigarettes    Smokeless tobacco: Never Used   Substance and Sexual Activity    Alcohol use: Yes     Alcohol/week: 7.0 oz     Types: 14 Standard drinks or equivalent per week     Comment: 5th of vodka daily     Drug use: No    Sexual activity: Not Currently     Review of Systems   Constitutional: Negative for chills and fever.   HENT: Negative for congestion.    Eyes: Negative for visual disturbance.   Respiratory: Positive for cough and shortness of breath.    Cardiovascular: Negative for chest pain, palpitations and leg swelling.   Gastrointestinal: Negative for abdominal distention, abdominal pain, diarrhea, nausea and vomiting.   Genitourinary: Negative for dysuria and hematuria.   Neurological: Negative for seizures, syncope and headaches.   Psychiatric/Behavioral: Negative for agitation and behavioral problems.     Objective:     Vital Signs (Most Recent):  Temp: 98.6 °F (37 °C) (10/04/18 0300)  Pulse: 72 (10/04/18 0300)  Resp: 20  (10/04/18 0300)  BP: (!) 182/100 (10/04/18 0300)  SpO2: 95 % (10/04/18 0300) Vital Signs (24h Range):  Temp:  [98.6 °F (37 °C)] 98.6 °F (37 °C)  Pulse:  [72-94] 72  Resp:  [20] 20  SpO2:  [94 %-95 %] 95 %  BP: (157-182)/() 182/100     Weight: 71.7 kg (158 lb)  Body mass index is 22.67 kg/m².    Physical Exam   Constitutional: He is oriented to person, place, and time. He appears well-developed and well-nourished.   HENT:   Head: Normocephalic and atraumatic.   Neck: Normal range of motion. Neck supple.   Cardiovascular: Normal rate, regular rhythm and normal heart sounds.   Pulmonary/Chest: Effort normal.   Abdominal: Soft. Bowel sounds are normal.   Musculoskeletal: He exhibits no edema.   Neurological: He is alert and oriented to person, place, and time.   Skin: Skin is warm and dry.   Psychiatric: He has a normal mood and affect. His behavior is normal.           Significant Labs: All pertinent labs within the past 24 hours have been reviewed.    Significant Imaging: I have reviewed all pertinent imaging results/findings within the past 24 hours.    Assessment/Plan:     * Alcohol withdrawal    -- Valium 5 mg Q8  -- Ativan with CIWA >8  -- Thiamin, folic acid multivitamin supplement daily.  -- Consider addiction psych consult in the morning.          Tobacco abuse    -- Nicotine patch PRN.          Major depressive disorder with single episode    -- Hx of SI 1 year ago.  -- Denies any SI/HI  -- Continue home zoloft 100 mg daily.          HTN (hypertension)    -- Continue home lisinopril 10 mg daily + amlodipine 5 mg daily.              VTE Risk Mitigation (From admission, onward)        Ordered     Place sequential compression device  Until discontinued      10/03/18 2147     IP VTE LOW RISK PATIENT  Once      10/03/18 2147             Artemio Triplett MD  Department of Hospital Medicine   [facility]

## 2018-10-04 NOTE — HPI
Mr. Antonio is a 64 y/o M with PMHx of depression, HTN and prior SI who presents today requesting assistance detoxing from alcohol.     He drank 1/2 of a fifth of vodka 2 hours prior to being seen in the ED. He also took 6 of his gabapentin and 4 ibuprofen pills for his neck pain. He denies attempting to harm himself by taking these pills. He states it did help his pain. He is complaning of headache and N/V. He states he gets GI upset, sweating, shakes when he tries to stop drinking. He has a hx of alcohol abuse, but has maintained sobriety for a period of years. He started drinking again about 2 weeks ago due to social stressors. He is now drinking to prevent detox s/s. He denies SI, HI, AH, VH.

## 2018-10-04 NOTE — HOSPITAL COURSE
Pt was admitted to hospital medicine 10/03 for alcohol withdrawal. Patient improved and required less benzo's.  Scheduled benzo's were removed on 10/7 and did not require PRNs after this time.  Patient developed an BERENICE, however, after fluids his Cr normalized. Patient will be discharged on 10/8 with f/u with PCP and repeat BMP to check Cr in 1 week.

## 2018-10-05 PROBLEM — G62.9 NEUROPATHY: Status: ACTIVE | Noted: 2018-10-05

## 2018-10-05 PROBLEM — K21.9 GERD (GASTROESOPHAGEAL REFLUX DISEASE): Status: ACTIVE | Noted: 2018-10-05

## 2018-10-05 LAB
ALBUMIN SERPL BCP-MCNC: 3 G/DL
ALP SERPL-CCNC: 134 U/L
ALT SERPL W/O P-5'-P-CCNC: 22 U/L
ANION GAP SERPL CALC-SCNC: 9 MMOL/L
AST SERPL-CCNC: 34 U/L
BILIRUB SERPL-MCNC: 0.8 MG/DL
BUN SERPL-MCNC: 12 MG/DL
CALCIUM SERPL-MCNC: 9.7 MG/DL
CHLORIDE SERPL-SCNC: 103 MMOL/L
CO2 SERPL-SCNC: 28 MMOL/L
CREAT SERPL-MCNC: 0.7 MG/DL
EST. GFR  (AFRICAN AMERICAN): >60 ML/MIN/1.73 M^2
EST. GFR  (NON AFRICAN AMERICAN): >60 ML/MIN/1.73 M^2
GLUCOSE SERPL-MCNC: 91 MG/DL
MAGNESIUM SERPL-MCNC: 1.4 MG/DL
PHOSPHATE SERPL-MCNC: 3.5 MG/DL
POTASSIUM SERPL-SCNC: 3.5 MMOL/L
PROT SERPL-MCNC: 6.9 G/DL
SODIUM SERPL-SCNC: 140 MMOL/L

## 2018-10-05 PROCEDURE — 36415 COLL VENOUS BLD VENIPUNCTURE: CPT

## 2018-10-05 PROCEDURE — 83735 ASSAY OF MAGNESIUM: CPT

## 2018-10-05 PROCEDURE — 25000003 PHARM REV CODE 250: Performed by: STUDENT IN AN ORGANIZED HEALTH CARE EDUCATION/TRAINING PROGRAM

## 2018-10-05 PROCEDURE — S4991 NICOTINE PATCH NONLEGEND: HCPCS | Performed by: STUDENT IN AN ORGANIZED HEALTH CARE EDUCATION/TRAINING PROGRAM

## 2018-10-05 PROCEDURE — 63600175 PHARM REV CODE 636 W HCPCS: Performed by: STUDENT IN AN ORGANIZED HEALTH CARE EDUCATION/TRAINING PROGRAM

## 2018-10-05 PROCEDURE — 80053 COMPREHEN METABOLIC PANEL: CPT

## 2018-10-05 PROCEDURE — 99232 SBSQ HOSP IP/OBS MODERATE 35: CPT | Mod: ,,, | Performed by: INTERNAL MEDICINE

## 2018-10-05 PROCEDURE — 25000003 PHARM REV CODE 250: Performed by: INTERNAL MEDICINE

## 2018-10-05 PROCEDURE — 84100 ASSAY OF PHOSPHORUS: CPT

## 2018-10-05 PROCEDURE — 11000001 HC ACUTE MED/SURG PRIVATE ROOM

## 2018-10-05 RX ORDER — OXCARBAZEPINE 300 MG/1
600 TABLET, FILM COATED ORAL NIGHTLY
Status: DISCONTINUED | OUTPATIENT
Start: 2018-10-05 | End: 2018-10-08 | Stop reason: HOSPADM

## 2018-10-05 RX ORDER — POTASSIUM CHLORIDE 750 MG/1
40 CAPSULE, EXTENDED RELEASE ORAL ONCE
Status: COMPLETED | OUTPATIENT
Start: 2018-10-05 | End: 2018-10-05

## 2018-10-05 RX ORDER — PANTOPRAZOLE SODIUM 40 MG/1
40 TABLET, DELAYED RELEASE ORAL DAILY
Status: DISCONTINUED | OUTPATIENT
Start: 2018-10-05 | End: 2018-10-08 | Stop reason: HOSPADM

## 2018-10-05 RX ORDER — GABAPENTIN 300 MG/1
600 CAPSULE ORAL 2 TIMES DAILY
Status: DISCONTINUED | OUTPATIENT
Start: 2018-10-05 | End: 2018-10-08 | Stop reason: HOSPADM

## 2018-10-05 RX ORDER — LANOLIN ALCOHOL/MO/W.PET/CERES
400 CREAM (GRAM) TOPICAL 2 TIMES DAILY
Status: COMPLETED | OUTPATIENT
Start: 2018-10-05 | End: 2018-10-05

## 2018-10-05 RX ORDER — DIAZEPAM 5 MG/1
10 TABLET ORAL EVERY 8 HOURS
Status: DISCONTINUED | OUTPATIENT
Start: 2018-10-05 | End: 2018-10-06

## 2018-10-05 RX ADMIN — MAGNESIUM OXIDE TAB 400 MG (241.3 MG ELEMENTAL MG) 400 MG: 400 (241.3 MG) TAB at 08:10

## 2018-10-05 RX ADMIN — MAGNESIUM OXIDE TAB 400 MG (241.3 MG ELEMENTAL MG) 400 MG: 400 (241.3 MG) TAB at 09:10

## 2018-10-05 RX ADMIN — GABAPENTIN 600 MG: 300 CAPSULE ORAL at 01:10

## 2018-10-05 RX ADMIN — POTASSIUM CHLORIDE 40 MEQ: 750 CAPSULE, EXTENDED RELEASE ORAL at 01:10

## 2018-10-05 RX ADMIN — ENOXAPARIN SODIUM 40 MG: 100 INJECTION SUBCUTANEOUS at 06:10

## 2018-10-05 RX ADMIN — Medication 100 MG: at 08:10

## 2018-10-05 RX ADMIN — AMLODIPINE BESYLATE 5 MG: 5 TABLET ORAL at 08:10

## 2018-10-05 RX ADMIN — LISINOPRIL 20 MG: 20 TABLET ORAL at 08:10

## 2018-10-05 RX ADMIN — FOLIC ACID 1 MG: 1 TABLET ORAL at 08:10

## 2018-10-05 RX ADMIN — LORAZEPAM 2 MG: 1 TABLET ORAL at 04:10

## 2018-10-05 RX ADMIN — DIAZEPAM 10 MG: 5 TABLET ORAL at 01:10

## 2018-10-05 RX ADMIN — DIAZEPAM 5 MG: 5 TABLET ORAL at 05:10

## 2018-10-05 RX ADMIN — RAMELTEON 8 MG: 8 TABLET, FILM COATED ORAL at 09:10

## 2018-10-05 RX ADMIN — Medication 1 TABLET: at 08:10

## 2018-10-05 RX ADMIN — LORAZEPAM 2 MG: 1 TABLET ORAL at 08:10

## 2018-10-05 RX ADMIN — LORAZEPAM 2 MG: 1 TABLET ORAL at 11:10

## 2018-10-05 RX ADMIN — SENNOSIDES AND DOCUSATE SODIUM 1 TABLET: 8.6; 5 TABLET ORAL at 08:10

## 2018-10-05 RX ADMIN — IBUPROFEN 600 MG: 200 TABLET, FILM COATED ORAL at 04:10

## 2018-10-05 RX ADMIN — NICOTINE 1 PATCH: 14 PATCH, EXTENDED RELEASE TRANSDERMAL at 09:10

## 2018-10-05 RX ADMIN — OXCARBAZEPINE 600 MG: 300 TABLET ORAL at 09:10

## 2018-10-05 RX ADMIN — SERTRALINE 100 MG: 100 TABLET, FILM COATED ORAL at 08:10

## 2018-10-05 RX ADMIN — DIAZEPAM 10 MG: 5 TABLET ORAL at 09:10

## 2018-10-05 RX ADMIN — BENZONATATE 100 MG: 100 CAPSULE ORAL at 05:10

## 2018-10-05 RX ADMIN — THERA TABS 1 TABLET: TAB at 08:10

## 2018-10-05 RX ADMIN — GABAPENTIN 600 MG: 300 CAPSULE ORAL at 09:10

## 2018-10-05 RX ADMIN — PANTOPRAZOLE SODIUM 40 MG: 40 TABLET, DELAYED RELEASE ORAL at 01:10

## 2018-10-05 NOTE — PHARMACY MED REC
"Admission Medication Reconciliation - Pharmacy Consult Note    The home medication history was taken by Jyoti Burgess Pharmacy Tech.  Based on information gathered and subsequent review by the clinical pharmacist, the items below may need attention.    You may go to "Admission" then "Reconcile Home Medications" tabs to review and/or act upon these items.      No issues noted with the medication reconciliation.      Please address this information as you see fit.  Feel free to contact us if you have any questions or require assistance.    Sussy Ruiz, EvetteD  [id number]                  .    .          "
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
## 2018-10-05 NOTE — ASSESSMENT & PLAN NOTE
-- Valium increased to 10 mg Q8 based on symptoms and need for lorazepam  -- Ativan with CIWA >8  -- Thiamin, folic acid multivitamin supplement daily
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
## 2018-10-05 NOTE — SUBJECTIVE & OBJECTIVE
Interval History: Patient received lorazepam 3 times overnight. Patient endorses diaphoresis, chills, tremors, but denies hallucinations, parathesisas    Review of Systems   Constitutional: Positive for chills and diaphoresis. Negative for fatigue and fever.   HENT: Negative for congestion.    Eyes: Negative for visual disturbance.   Respiratory: Positive for cough and shortness of breath.    Cardiovascular: Negative for chest pain, palpitations and leg swelling.   Gastrointestinal: Negative for abdominal distention, abdominal pain, diarrhea, nausea and vomiting.   Genitourinary: Negative for dysuria and hematuria.   Neurological: Negative for seizures, syncope and headaches.   Psychiatric/Behavioral: Negative for agitation, behavioral problems and hallucinations.     Objective:     Vital Signs (Most Recent):  Temp: 97.2 °F (36.2 °C) (10/05/18 1448)  Pulse: 102 (10/05/18 1448)  Resp: 18 (10/05/18 1448)  BP: 123/78 (10/05/18 1448)  SpO2: 97 % (10/05/18 1448) Vital Signs (24h Range):  Temp:  [96.9 °F (36.1 °C)-98.9 °F (37.2 °C)] 97.2 °F (36.2 °C)  Pulse:  [] 102  Resp:  [18-26] 18  SpO2:  [89 %-97 %] 97 %  BP: (123-182)/() 123/78     Weight: 71.7 kg (158 lb)  Body mass index is 22.67 kg/m².    Intake/Output Summary (Last 24 hours) at 10/5/2018 1450  Last data filed at 10/5/2018 0600  Gross per 24 hour   Intake 500 ml   Output --   Net 500 ml      Physical Exam   Constitutional: He is oriented to person, place, and time. He appears well-developed and well-nourished. No distress.   HENT:   Head: Normocephalic and atraumatic.   Eyes: EOM are normal. Right eye exhibits no discharge. Left eye exhibits no discharge. No scleral icterus.   Neck: Normal range of motion. Neck supple. No tracheal deviation present.   Cardiovascular: Normal rate, regular rhythm and normal heart sounds.   Pulmonary/Chest: Effort normal.   Decreased breath sounds bilaterally, no wheezes, rales, ronchi   Abdominal: Soft. Bowel sounds are  normal.   Musculoskeletal: He exhibits deformity (deformities over the shin bilaterally). He exhibits no edema.   Neurological: He is alert and oriented to person, place, and time.   No asterixis, resting tremor not appreciated   Skin: Skin is warm. He is diaphoretic.   Psychiatric: He has a normal mood and affect. His behavior is normal.       Significant Labs:   BMP:   Recent Labs   Lab  10/05/18   0345   GLU  91   NA  140   K  3.5   CL  103   CO2  28   BUN  12   CREATININE  0.7   CALCIUM  9.7   MG  1.4*     CBC:   Recent Labs   Lab  10/03/18   1715  10/04/18   1201   WBC  7.19  6.92   HGB  13.3*  13.4*   HCT  38.2*  38.8*   PLT  250  223       Significant Imaging: I have reviewed and interpreted all pertinent imaging results/findings within the past 24 hours.

## 2018-10-05 NOTE — SUBJECTIVE & OBJECTIVE
Interval History: NAEON. Patient has no acute complaints. Denies hallucinations. Endorses tremors.    Review of Systems   Constitutional: Positive for chills and diaphoresis. Negative for fatigue and fever.   HENT: Negative for congestion.    Eyes: Negative for visual disturbance.   Respiratory: Positive for cough and shortness of breath.    Cardiovascular: Negative for chest pain, palpitations and leg swelling.   Gastrointestinal: Negative for abdominal distention, abdominal pain, diarrhea, nausea and vomiting.   Genitourinary: Negative for dysuria and hematuria.   Neurological: Negative for seizures, syncope and headaches.   Psychiatric/Behavioral: Negative for agitation, behavioral problems and hallucinations.     Objective:     Vital Signs (Most Recent):  Temp: 97.2 °F (36.2 °C) (10/05/18 1448)  Pulse: 91 (10/05/18 1600)  Resp: 18 (10/05/18 1448)  BP: 123/78 (10/05/18 1448)  SpO2: 97 % (10/05/18 1448) Vital Signs (24h Range):  Temp:  [96.9 °F (36.1 °C)-98.9 °F (37.2 °C)] 97.2 °F (36.2 °C)  Pulse:  [] 91  Resp:  [18-26] 18  SpO2:  [89 %-97 %] 97 %  BP: (123-182)/() 123/78     Weight: 71.7 kg (158 lb)  Body mass index is 22.67 kg/m².    Intake/Output Summary (Last 24 hours) at 10/5/2018 1601  Last data filed at 10/5/2018 0600  Gross per 24 hour   Intake 500 ml   Output --   Net 500 ml      Physical Exam   Constitutional: He is oriented to person, place, and time. He appears well-developed and well-nourished. No distress.   HENT:   Head: Normocephalic and atraumatic.   Eyes: EOM are normal. Right eye exhibits no discharge. Left eye exhibits no discharge. No scleral icterus.   Neck: Normal range of motion. Neck supple. No tracheal deviation present.   Cardiovascular: Normal rate, regular rhythm and normal heart sounds.   Pulmonary/Chest: Effort normal.   Decreased breath sounds bilaterally, no wheezes, rales, ronchi   Abdominal: Soft. Bowel sounds are normal.   Musculoskeletal: He exhibits deformity  (deformities over the shin bilaterally). He exhibits no edema.   Neurological: He is alert and oriented to person, place, and time.   No asterixis, resting tremor not appreciated   Skin: Skin is warm. He is diaphoretic.   Psychiatric: He has a normal mood and affect. His behavior is normal.       Significant Labs:   BMP:   Recent Labs   Lab  10/05/18   0345   GLU  91   NA  140   K  3.5   CL  103   CO2  28   BUN  12   CREATININE  0.7   CALCIUM  9.7   MG  1.4*     CBC:   Recent Labs   Lab  10/03/18   1715  10/04/18   1201   WBC  7.19  6.92   HGB  13.3*  13.4*   HCT  38.2*  38.8*   PLT  250  223       Significant Imaging: I have reviewed and interpreted all pertinent imaging results/findings within the past 24 hours.

## 2018-10-05 NOTE — PLAN OF CARE
Problem: Patient Care Overview  Goal: Plan of Care Review  Outcome: Ongoing (interventions implemented as appropriate)  Pt alert and oriented, calm and cooperative to care. Showed the understanding of POC. Severe headache d/t ETOH withdrawal persists. PRN ativan and tylenol given with some effect. Observed the minimal sweat and tremor, however, pt remains calm without agitation and restless. No hallucination or visual disturbance reported. Coughs noted at times. Kept comfortable. Call light within easy reach. All needs attended. No distress noted.
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
## 2018-10-06 LAB
ALBUMIN SERPL BCP-MCNC: 3.1 G/DL
ALP SERPL-CCNC: 134 U/L
ALT SERPL W/O P-5'-P-CCNC: 22 U/L
ANION GAP SERPL CALC-SCNC: 8 MMOL/L
AST SERPL-CCNC: 27 U/L
BILIRUB SERPL-MCNC: 0.3 MG/DL
BUN SERPL-MCNC: 17 MG/DL
CALCIUM SERPL-MCNC: 9.7 MG/DL
CHLORIDE SERPL-SCNC: 106 MMOL/L
CO2 SERPL-SCNC: 25 MMOL/L
CREAT SERPL-MCNC: 0.8 MG/DL
EST. GFR  (AFRICAN AMERICAN): >60 ML/MIN/1.73 M^2
EST. GFR  (NON AFRICAN AMERICAN): >60 ML/MIN/1.73 M^2
GLUCOSE SERPL-MCNC: 107 MG/DL
MAGNESIUM SERPL-MCNC: 1.5 MG/DL
PHOSPHATE SERPL-MCNC: 3.5 MG/DL
POTASSIUM SERPL-SCNC: 4 MMOL/L
PROT SERPL-MCNC: 6.9 G/DL
SODIUM SERPL-SCNC: 139 MMOL/L

## 2018-10-06 PROCEDURE — 25000003 PHARM REV CODE 250: Performed by: INTERNAL MEDICINE

## 2018-10-06 PROCEDURE — S4991 NICOTINE PATCH NONLEGEND: HCPCS | Performed by: STUDENT IN AN ORGANIZED HEALTH CARE EDUCATION/TRAINING PROGRAM

## 2018-10-06 PROCEDURE — 25000003 PHARM REV CODE 250: Performed by: STUDENT IN AN ORGANIZED HEALTH CARE EDUCATION/TRAINING PROGRAM

## 2018-10-06 PROCEDURE — 99232 SBSQ HOSP IP/OBS MODERATE 35: CPT | Mod: ,,, | Performed by: INTERNAL MEDICINE

## 2018-10-06 PROCEDURE — 83735 ASSAY OF MAGNESIUM: CPT

## 2018-10-06 PROCEDURE — 11000001 HC ACUTE MED/SURG PRIVATE ROOM

## 2018-10-06 PROCEDURE — 84100 ASSAY OF PHOSPHORUS: CPT

## 2018-10-06 PROCEDURE — 63600175 PHARM REV CODE 636 W HCPCS: Performed by: STUDENT IN AN ORGANIZED HEALTH CARE EDUCATION/TRAINING PROGRAM

## 2018-10-06 PROCEDURE — 80053 COMPREHEN METABOLIC PANEL: CPT

## 2018-10-06 PROCEDURE — 36415 COLL VENOUS BLD VENIPUNCTURE: CPT

## 2018-10-06 RX ORDER — LISINOPRIL 20 MG/1
40 TABLET ORAL DAILY
Status: DISCONTINUED | OUTPATIENT
Start: 2018-10-07 | End: 2018-10-08

## 2018-10-06 RX ORDER — DIAZEPAM 5 MG/1
5 TABLET ORAL EVERY 8 HOURS
Status: DISCONTINUED | OUTPATIENT
Start: 2018-10-06 | End: 2018-10-07

## 2018-10-06 RX ORDER — LANOLIN ALCOHOL/MO/W.PET/CERES
400 CREAM (GRAM) TOPICAL 2 TIMES DAILY
Status: COMPLETED | OUTPATIENT
Start: 2018-10-06 | End: 2018-10-06

## 2018-10-06 RX ADMIN — GABAPENTIN 600 MG: 300 CAPSULE ORAL at 08:10

## 2018-10-06 RX ADMIN — Medication 100 MG: at 08:10

## 2018-10-06 RX ADMIN — LISINOPRIL 20 MG: 20 TABLET ORAL at 08:10

## 2018-10-06 RX ADMIN — MAGNESIUM OXIDE TAB 400 MG (241.3 MG ELEMENTAL MG) 400 MG: 400 (241.3 MG) TAB at 08:10

## 2018-10-06 RX ADMIN — OXCARBAZEPINE 600 MG: 300 TABLET ORAL at 08:10

## 2018-10-06 RX ADMIN — DIAZEPAM 10 MG: 5 TABLET ORAL at 02:10

## 2018-10-06 RX ADMIN — DIAZEPAM 10 MG: 5 TABLET ORAL at 06:10

## 2018-10-06 RX ADMIN — BENZONATATE 100 MG: 100 CAPSULE ORAL at 03:10

## 2018-10-06 RX ADMIN — LORAZEPAM 2 MG: 1 TABLET ORAL at 11:10

## 2018-10-06 RX ADMIN — FOLIC ACID 1 MG: 1 TABLET ORAL at 08:10

## 2018-10-06 RX ADMIN — AMLODIPINE BESYLATE 5 MG: 5 TABLET ORAL at 08:10

## 2018-10-06 RX ADMIN — RAMELTEON 8 MG: 8 TABLET, FILM COATED ORAL at 08:10

## 2018-10-06 RX ADMIN — Medication 1 TABLET: at 08:10

## 2018-10-06 RX ADMIN — SENNOSIDES AND DOCUSATE SODIUM 1 TABLET: 8.6; 5 TABLET ORAL at 08:10

## 2018-10-06 RX ADMIN — NICOTINE 1 PATCH: 14 PATCH, EXTENDED RELEASE TRANSDERMAL at 08:10

## 2018-10-06 RX ADMIN — MAGNESIUM OXIDE TAB 400 MG (241.3 MG ELEMENTAL MG) 400 MG: 400 (241.3 MG) TAB at 11:10

## 2018-10-06 RX ADMIN — PANTOPRAZOLE SODIUM 40 MG: 40 TABLET, DELAYED RELEASE ORAL at 08:10

## 2018-10-06 RX ADMIN — THERA TABS 1 TABLET: TAB at 08:10

## 2018-10-06 RX ADMIN — ENOXAPARIN SODIUM 40 MG: 100 INJECTION SUBCUTANEOUS at 05:10

## 2018-10-06 RX ADMIN — LORAZEPAM 2 MG: 1 TABLET ORAL at 03:10

## 2018-10-06 RX ADMIN — SERTRALINE 100 MG: 100 TABLET, FILM COATED ORAL at 08:10

## 2018-10-06 RX ADMIN — DIAZEPAM 5 MG: 5 TABLET ORAL at 10:10

## 2018-10-06 RX ADMIN — BENZONATATE 100 MG: 100 CAPSULE ORAL at 11:10

## 2018-10-06 NOTE — NURSING
Patient ambulates with unsteady gait downstairs to smoke AMA. Reiterated education to patient about being a Fall Risk along with elevated Bps. Patient verbalizes understanding and states that he will inform the nurse if he is to leave the floor yet sneaks off the floor, intermittently. Med Team 5 notified; Telesitter ordered. Will continue to monitor.
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
## 2018-10-06 NOTE — ASSESSMENT & PLAN NOTE
-- Valium increased to 10 mg Q8 based on symptoms and need for lorazepam  -- Ativan with CIWA >8  -- Thiamin, folic acid multivitamin supplement daily  -continue to monitor  -Mg repleted 10/6, Magnesium oxide 400 mg BID x2 doses
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
## 2018-10-06 NOTE — NURSING
Pt AAOx4, up sitting in chair. Explained plan of care, pt verbalizes understanding. Questions/concerns addressed. Denies palpitations, tremors, HA. C/o 5/10 chronic neck, back, leg pain.

## 2018-10-06 NOTE — PLAN OF CARE
Problem: Fall Risk (Adult)  Goal: Identify Related Risk Factors and Signs and Symptoms  Related risk factors and signs and symptoms are identified upon initiation of Human Response Clinical Practice Guideline (CPG)  Outcome: Ongoing (interventions implemented as appropriate)   10/06/18 1629   Fall Risk   Related Risk Factors (Fall Risk) age-related changes;gait/mobility problems;impaired vision;polypharmacy       Problem: Patient Care Overview  Goal: Plan of Care Review  Outcome: Ongoing (interventions implemented as appropriate)  POC reviewed with patient; patient verbalizes understanding. Reinforcement may be needed. Patient compliant with medication regimen. Patient ambulates around room, independently with a slow but steady gait. Safety precautions in place; call light within reach, bed in low position, wheels locked, side rails x2. Will continue to monitor.

## 2018-10-07 LAB
ALBUMIN SERPL BCP-MCNC: 3.4 G/DL
ALLENS TEST: ABNORMAL
ALP SERPL-CCNC: 143 U/L
ALT SERPL W/O P-5'-P-CCNC: 25 U/L
ANION GAP SERPL CALC-SCNC: 8 MMOL/L
AST SERPL-CCNC: 29 U/L
BILIRUB SERPL-MCNC: 0.3 MG/DL
BUN SERPL-MCNC: 16 MG/DL
CALCIUM SERPL-MCNC: 10.1 MG/DL
CHLORIDE SERPL-SCNC: 104 MMOL/L
CO2 SERPL-SCNC: 27 MMOL/L
CREAT SERPL-MCNC: 0.8 MG/DL
DELSYS: ABNORMAL
EST. GFR  (AFRICAN AMERICAN): >60 ML/MIN/1.73 M^2
EST. GFR  (NON AFRICAN AMERICAN): >60 ML/MIN/1.73 M^2
ETHANOL SERPL-MCNC: <10 MG/DL
ETHANOL SERPL-MCNC: <10 MG/DL
GLUCOSE SERPL-MCNC: 98 MG/DL
HCO3 UR-SCNC: 30.3 MMOL/L (ref 24–28)
MAGNESIUM SERPL-MCNC: 1.6 MG/DL
PCO2 BLDA: 45 MMHG (ref 35–45)
PH SMN: 7.44 [PH] (ref 7.35–7.45)
PHOSPHATE SERPL-MCNC: 3.7 MG/DL
PO2 BLDA: 57 MMHG (ref 80–100)
POC BE: 6 MMOL/L
POC SATURATED O2: 90 % (ref 95–100)
POC TCO2: 32 MMOL/L (ref 23–27)
POTASSIUM SERPL-SCNC: 3.8 MMOL/L
PROT SERPL-MCNC: 7.6 G/DL
SAMPLE: ABNORMAL
SITE: ABNORMAL
SODIUM SERPL-SCNC: 139 MMOL/L

## 2018-10-07 PROCEDURE — 80053 COMPREHEN METABOLIC PANEL: CPT

## 2018-10-07 PROCEDURE — 11000001 HC ACUTE MED/SURG PRIVATE ROOM

## 2018-10-07 PROCEDURE — 63600175 PHARM REV CODE 636 W HCPCS: Performed by: STUDENT IN AN ORGANIZED HEALTH CARE EDUCATION/TRAINING PROGRAM

## 2018-10-07 PROCEDURE — 84100 ASSAY OF PHOSPHORUS: CPT

## 2018-10-07 PROCEDURE — 25000242 PHARM REV CODE 250 ALT 637 W/ HCPCS: Performed by: STUDENT IN AN ORGANIZED HEALTH CARE EDUCATION/TRAINING PROGRAM

## 2018-10-07 PROCEDURE — 83735 ASSAY OF MAGNESIUM: CPT

## 2018-10-07 PROCEDURE — 25000003 PHARM REV CODE 250: Performed by: STUDENT IN AN ORGANIZED HEALTH CARE EDUCATION/TRAINING PROGRAM

## 2018-10-07 PROCEDURE — 99232 SBSQ HOSP IP/OBS MODERATE 35: CPT | Mod: ,,, | Performed by: HOSPITALIST

## 2018-10-07 PROCEDURE — S4991 NICOTINE PATCH NONLEGEND: HCPCS | Performed by: STUDENT IN AN ORGANIZED HEALTH CARE EDUCATION/TRAINING PROGRAM

## 2018-10-07 PROCEDURE — 94761 N-INVAS EAR/PLS OXIMETRY MLT: CPT

## 2018-10-07 PROCEDURE — 80320 DRUG SCREEN QUANTALCOHOLS: CPT

## 2018-10-07 PROCEDURE — 82803 BLOOD GASES ANY COMBINATION: CPT

## 2018-10-07 PROCEDURE — 36415 COLL VENOUS BLD VENIPUNCTURE: CPT

## 2018-10-07 PROCEDURE — 25000003 PHARM REV CODE 250: Performed by: INTERNAL MEDICINE

## 2018-10-07 PROCEDURE — 36600 WITHDRAWAL OF ARTERIAL BLOOD: CPT

## 2018-10-07 PROCEDURE — 99900035 HC TECH TIME PER 15 MIN (STAT)

## 2018-10-07 RX ORDER — DIAZEPAM 5 MG/1
5 TABLET ORAL EVERY 12 HOURS
Status: DISCONTINUED | OUTPATIENT
Start: 2018-10-07 | End: 2018-10-07

## 2018-10-07 RX ADMIN — SERTRALINE 100 MG: 100 TABLET, FILM COATED ORAL at 09:10

## 2018-10-07 RX ADMIN — DIAZEPAM 5 MG: 5 TABLET ORAL at 05:10

## 2018-10-07 RX ADMIN — ENOXAPARIN SODIUM 40 MG: 100 INJECTION SUBCUTANEOUS at 05:10

## 2018-10-07 RX ADMIN — FOLIC ACID 1 MG: 1 TABLET ORAL at 09:10

## 2018-10-07 RX ADMIN — PANTOPRAZOLE SODIUM 40 MG: 40 TABLET, DELAYED RELEASE ORAL at 09:10

## 2018-10-07 RX ADMIN — Medication 100 MG: at 09:10

## 2018-10-07 RX ADMIN — GABAPENTIN 600 MG: 300 CAPSULE ORAL at 09:10

## 2018-10-07 RX ADMIN — RAMELTEON 8 MG: 8 TABLET, FILM COATED ORAL at 08:10

## 2018-10-07 RX ADMIN — OXCARBAZEPINE 600 MG: 300 TABLET ORAL at 08:10

## 2018-10-07 RX ADMIN — THERA TABS 1 TABLET: TAB at 09:10

## 2018-10-07 RX ADMIN — GABAPENTIN 600 MG: 300 CAPSULE ORAL at 08:10

## 2018-10-07 RX ADMIN — Medication 1 TABLET: at 09:10

## 2018-10-07 RX ADMIN — IBUPROFEN 600 MG: 200 TABLET, FILM COATED ORAL at 05:10

## 2018-10-07 RX ADMIN — SENNOSIDES AND DOCUSATE SODIUM 1 TABLET: 8.6; 5 TABLET ORAL at 08:10

## 2018-10-07 RX ADMIN — LISINOPRIL 40 MG: 20 TABLET ORAL at 09:10

## 2018-10-07 RX ADMIN — FLUTICASONE PROPIONATE 100 MCG: 50 SPRAY, METERED NASAL at 09:10

## 2018-10-07 RX ADMIN — NICOTINE 1 PATCH: 14 PATCH, EXTENDED RELEASE TRANSDERMAL at 08:10

## 2018-10-07 RX ADMIN — AMLODIPINE BESYLATE 5 MG: 5 TABLET ORAL at 09:10

## 2018-10-07 NOTE — PROGRESS NOTES
Pt unplugged telesitter. Instructed pt to not unplug telesitter. Reeducated on the importance of the telesitter. Pt verbalized understanding.
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
## 2018-10-07 NOTE — PLAN OF CARE
Problem: Patient Care Overview  Goal: Plan of Care Review  Outcome: Ongoing (interventions implemented as appropriate)  Pt disoriented to situation. Pt somnolent and lethargic at times.  Pt rouses to voice and touch.  Pt left unit once accompanied by CLAUDIA Siddiqui to smoke a cigarette. SBP elevated throughout shift, 170s.  Pt placed on 2 L NC O2 d/t low pO2 at 57.  All other VSS.  Aside from orientation and decreased ability to arouse, neuro checks stable. Telemetry discontinued.  Pending urine sample. Will continue to monitor pt.

## 2018-10-07 NOTE — PROGRESS NOTES
Patient attempted to leave room numerous times. States he either wants to leave or go smoke a cigarette. Pt accompanied downstairs by unit secretary two times throughout the night. This AM patient states that he did not receive any of his medications. I reoriented the patient to his previous medication administration. Pt states that he wants to go home and he could walk out at any time. I educated him on leaving AMA and the patient agreed to stay in his room until MDs round in the AM. Stas.
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
## 2018-10-07 NOTE — ASSESSMENT & PLAN NOTE
-- Valium 5 mg BID x5 days, then valium 5 mg PO qdayx5 days \  -- Ativan with CIWA >8, did not require overnight  -- Thiamin, folic acid multivitamin supplement daily  -continue to monitor  -Mg repleted 10/6, Magnesium oxide 400 mg BID x2 doses

## 2018-10-07 NOTE — NURSING
Spoke with IM-5.  Pt removed telemetry leads and box twice. Leads replaced. Pt educated on cardiac monitoring.  No evidence of learning.     Pt NSR, HR within normal limits.  Provider will d/c telemetry order.

## 2018-10-08 VITALS
RESPIRATION RATE: 18 BRPM | HEIGHT: 70 IN | BODY MASS INDEX: 22.62 KG/M2 | TEMPERATURE: 98 F | DIASTOLIC BLOOD PRESSURE: 92 MMHG | WEIGHT: 158 LBS | SYSTOLIC BLOOD PRESSURE: 175 MMHG | OXYGEN SATURATION: 99 % | HEART RATE: 74 BPM

## 2018-10-08 PROBLEM — N17.9 AKI (ACUTE KIDNEY INJURY): Status: ACTIVE | Noted: 2018-10-08

## 2018-10-08 LAB
ALBUMIN SERPL BCP-MCNC: 3.2 G/DL
ALP SERPL-CCNC: 128 U/L
ALT SERPL W/O P-5'-P-CCNC: 24 U/L
AMPHET+METHAMPHET UR QL: NEGATIVE
ANION GAP SERPL CALC-SCNC: 8 MMOL/L
ANION GAP SERPL CALC-SCNC: 9 MMOL/L
AST SERPL-CCNC: 24 U/L
BARBITURATES UR QL SCN>200 NG/ML: NEGATIVE
BENZODIAZ UR QL SCN>200 NG/ML: NORMAL
BILIRUB SERPL-MCNC: 0.3 MG/DL
BUN SERPL-MCNC: 19 MG/DL
BUN SERPL-MCNC: 21 MG/DL
BZE UR QL SCN: NEGATIVE
CALCIUM SERPL-MCNC: 9.1 MG/DL
CALCIUM SERPL-MCNC: 9.5 MG/DL
CANNABINOIDS UR QL SCN: NEGATIVE
CHLORIDE SERPL-SCNC: 103 MMOL/L
CHLORIDE SERPL-SCNC: 107 MMOL/L
CO2 SERPL-SCNC: 26 MMOL/L
CO2 SERPL-SCNC: 30 MMOL/L
CREAT SERPL-MCNC: 0.9 MG/DL
CREAT SERPL-MCNC: 1.6 MG/DL
CREAT UR-MCNC: 319 MG/DL
EST. GFR  (AFRICAN AMERICAN): 52.2 ML/MIN/1.73 M^2
EST. GFR  (AFRICAN AMERICAN): >60 ML/MIN/1.73 M^2
EST. GFR  (NON AFRICAN AMERICAN): 45.2 ML/MIN/1.73 M^2
EST. GFR  (NON AFRICAN AMERICAN): >60 ML/MIN/1.73 M^2
ETHANOL UR-MCNC: <10 MG/DL
ETHANOL UR-MCNC: <10 MG/DL
GLUCOSE SERPL-MCNC: 101 MG/DL
GLUCOSE SERPL-MCNC: 143 MG/DL
MAGNESIUM SERPL-MCNC: 1.7 MG/DL
METHADONE UR QL SCN>300 NG/ML: NEGATIVE
OPIATES UR QL SCN: NEGATIVE
PCP UR QL SCN>25 NG/ML: NEGATIVE
PHOSPHATE SERPL-MCNC: 5 MG/DL
POTASSIUM SERPL-SCNC: 4 MMOL/L
POTASSIUM SERPL-SCNC: 4.2 MMOL/L
PROT SERPL-MCNC: 7.2 G/DL
SODIUM SERPL-SCNC: 141 MMOL/L
SODIUM SERPL-SCNC: 142 MMOL/L
TOXICOLOGY INFORMATION: NORMAL

## 2018-10-08 PROCEDURE — 84100 ASSAY OF PHOSPHORUS: CPT

## 2018-10-08 PROCEDURE — 25000242 PHARM REV CODE 250 ALT 637 W/ HCPCS: Performed by: STUDENT IN AN ORGANIZED HEALTH CARE EDUCATION/TRAINING PROGRAM

## 2018-10-08 PROCEDURE — 25000003 PHARM REV CODE 250: Performed by: STUDENT IN AN ORGANIZED HEALTH CARE EDUCATION/TRAINING PROGRAM

## 2018-10-08 PROCEDURE — 80053 COMPREHEN METABOLIC PANEL: CPT

## 2018-10-08 PROCEDURE — 83735 ASSAY OF MAGNESIUM: CPT

## 2018-10-08 PROCEDURE — 80048 BASIC METABOLIC PNL TOTAL CA: CPT

## 2018-10-08 PROCEDURE — 25000003 PHARM REV CODE 250: Performed by: INTERNAL MEDICINE

## 2018-10-08 PROCEDURE — 36415 COLL VENOUS BLD VENIPUNCTURE: CPT

## 2018-10-08 PROCEDURE — 80307 DRUG TEST PRSMV CHEM ANLYZR: CPT

## 2018-10-08 PROCEDURE — 99238 HOSP IP/OBS DSCHRG MGMT 30/<: CPT | Mod: ,,, | Performed by: HOSPITALIST

## 2018-10-08 RX ORDER — DIAZEPAM 5 MG/1
5 TABLET ORAL DAILY
Qty: 7 TABLET | Refills: 0 | Status: SHIPPED | OUTPATIENT
Start: 2018-10-08 | End: 2018-10-08

## 2018-10-08 RX ORDER — GABAPENTIN 600 MG/1
600 TABLET ORAL 3 TIMES DAILY
Qty: 90 TABLET | Refills: 0 | Status: SHIPPED | OUTPATIENT
Start: 2018-10-08

## 2018-10-08 RX ORDER — DIAZEPAM 5 MG/1
5 TABLET ORAL DAILY
Qty: 7 TABLET | Refills: 0 | Status: SHIPPED | OUTPATIENT
Start: 2018-10-08 | End: 2018-10-15

## 2018-10-08 RX ORDER — LISINOPRIL 40 MG/1
40 TABLET ORAL DAILY
Qty: 30 TABLET | Refills: 2 | Status: SHIPPED | OUTPATIENT
Start: 2018-10-08 | End: 2019-10-08

## 2018-10-08 RX ADMIN — FOLIC ACID 1 MG: 1 TABLET ORAL at 09:10

## 2018-10-08 RX ADMIN — THERA TABS 1 TABLET: TAB at 09:10

## 2018-10-08 RX ADMIN — Medication 100 MG: at 09:10

## 2018-10-08 RX ADMIN — SODIUM CHLORIDE 1000 ML: 0.9 INJECTION, SOLUTION INTRAVENOUS at 12:10

## 2018-10-08 RX ADMIN — SENNOSIDES AND DOCUSATE SODIUM 1 TABLET: 8.6; 5 TABLET ORAL at 09:10

## 2018-10-08 RX ADMIN — AMLODIPINE BESYLATE 5 MG: 5 TABLET ORAL at 09:10

## 2018-10-08 RX ADMIN — Medication 1 TABLET: at 09:10

## 2018-10-08 RX ADMIN — LISINOPRIL 40 MG: 20 TABLET ORAL at 09:10

## 2018-10-08 RX ADMIN — SERTRALINE 100 MG: 100 TABLET, FILM COATED ORAL at 09:10

## 2018-10-08 RX ADMIN — FLUTICASONE PROPIONATE 100 MCG: 50 SPRAY, METERED NASAL at 09:10

## 2018-10-08 RX ADMIN — PANTOPRAZOLE SODIUM 40 MG: 40 TABLET, DELAYED RELEASE ORAL at 09:10

## 2018-10-08 RX ADMIN — GABAPENTIN 600 MG: 300 CAPSULE ORAL at 09:10

## 2018-10-08 NOTE — PLAN OF CARE
CM informed sw pt transport needs to be cancelled, due to dr orders.    SW left message for Mels cancelling transport.    Jyoti Egan, NIAW [id number]

## 2018-10-08 NOTE — PLAN OF CARE
Problem: Patient Care Overview  Goal: Plan of Care Review  Outcome: Outcome(s) achieved Date Met: 10/08/18  Reviewed all discharge instructions and patient verbalized understanding.Patient stated physician brought him his prescription for valium . Aware to  prescriptions from pharmacy. Iv Removed from left forearm at this time no free bleeding or edema or redness. Tolerated all am medications well. Had a bolus of normal saline today and labs. Transportation set up for patient per . Verbalized understanding of all instructions and medications . Awaiting transportation.

## 2018-10-08 NOTE — PROGRESS NOTES
Multiple checks made on patient since he returned from smoking and each time he was sleeping supine in no apparent distress. Still no urine collected. Will continue to monitor.

## 2018-10-08 NOTE — PLAN OF CARE
PLAN IS TO DISCHARGE PT HOME TODAY SW WILL ARRANGE TRANSPORTATION HOME   POST HOSPITAL F/U WITH PCP MADE 10/27 @10:30     10/08/18 9722   Discharge Reassessment   Assessment Type Discharge Planning Reassessment   Provided patient/caregiver education on the expected discharge date and the discharge plan No   Do you have any problems affording any of your prescribed medications? No   Discharge Plan A Home

## 2018-10-08 NOTE — NURSING
aware of bolus needed and labs. Iv normal saline infusing at this time per orders. Tolerating bolus well.

## 2018-10-08 NOTE — PLAN OF CARE
IM5 informed sw pt is now ready for d/c. Transport arranged through [facility] for a 4:45pm . SW informed nurse.    No additional sw needs at this time.    Jyoti Egan, NIAW [id number]

## 2018-10-08 NOTE — NURSING
Physician rounded earlier and stated patient leaving today. Call to case management to see if anything else needs to be done for him.

## 2018-10-08 NOTE — PLAN OF CARE
Reviewed POC w patient who was confused as to potentially being discharged this evening. Calm, cooperative and verbalizes understanding. Set some serious boundaries w patient re: NOT leaving his room/ unit, again he verbalizes understanding. Arranged for patient to leave the unit once for smoking w staff as chaperone. When he returned he fell asleep. Patient was made aware multiple times this evening of outstanding urine sample to be collected for tox screen. As of 12:40am he has still not produced sample. I explained to him that the team would appreciate his compliance w this but that I cannot force him to produce urine sample. Full assessment per flow sheet. Will continue to monitor.

## 2018-10-08 NOTE — PLAN OF CARE
CM informed sw pt will d/c today, needs transport home.. SW arranged transport through [facility] for a 1pm .  CARLEE informed pts nurse.    No additional sw needs.    Jyoti Egan, LCSW [id number]

## 2018-10-08 NOTE — NURSING
Patient obtained medication from pharmacy. Awaiting transportation. Called and will be in half and hour.
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
## 2018-10-08 NOTE — ASSESSMENT & PLAN NOTE
-Patient developed an BERENICE, however, after fluids his Cr normalized.   -Patient will be discharged on 10/8 with f/u with PCP and repeat BMP to check Cr in 1 week.    DISPLAY PLAN FREE TEXT

## 2018-10-08 NOTE — NURSING
Tolerated IV bolus well. Left unit with charge nurse to go smoke and awaiting return to unit for labs work to be drawn.

## 2018-10-11 NOTE — PHYSICIAN QUERY
PT Name: Giuliano Antonio  MR #: [id number]  Physician Query Form - Renal Condition Clarification     Judy Maxwell RN CDS    Contact Information: [phone]    This form is a permanent document in the medical record.     QueryDate: October 11, 2018    By submitting this query, we are merely seeking further clarification of documentation. Please utilize your independent clinical judgment when addressing the question(s) below.    The Medical record contains the following:   Indicator Supporting Clinical Findings Location in Medical Record    Kidney (Renal) Insufficiency     X Kidney (Renal) Failure / Injury BERENICE (acute kidney injury)    10/8 DC Summary    Nephrotoxic Agents     X BUN/Creatinine GFR BUN 9, 12,17, 16, 19, 21  Creatinine 0.7,0.7, 0.8, 0.8, 1.6, 09  GFR >, >60, >60, >60, >60, 45.2, >60 10/3-10/8 Lab    Urine: Casts         Eosinophils      Dehydration     X Nausea/Vomiting  Positive for nausea and vomiting   10/3 ED Prov note    Dialysis/CRRT     X Treatment: -Patient developed an BERENICE, however, after fluids his Cr normalized.   -Patient will be discharged on 10/8 with f/u with PCP and repeat BMP to check Cr in 1 week.    10/8 DC Summary   X Other:  a 64 y/o M with PMHx of depression, HTN and prior SI who presents today requesting assistance detoxing from alcohol   10/4 H&P   Acute Kidney Injury / Acute Renal Failure has different defining criteria. A generally accepted guideline  is:   A greater than 100% (2X) rise in serum creatinine from baseline* occurring during the course of a single hospital stay.   *Baseline as determined by the providers judgment and consideration of previous lab values and other documentation, if available.    A diagnosis of Acute Kidney Injury/ Acute Renal Failure should incorporate abnormal labs and clinical findings that are clinically significant      References: 1. Nedra et al. Acute renal failure-definition, outcome measures, animal models, fluid therapy and information  technology needs: the Second International Consensus Conference of the Acute Dialysis Quality Initiative (ADQI) Group. Crit Care 2004; 8:B204; 2. Amadou et al. Acute Kidney Injury Network: report of an initiative to improve outcomes in acute kidney injury. Crit Care 2007; 11:R31; 3. Kidney Disease: Improving Global Outcomes (KDIGO). Acute Kidney Injury Work Group. KDIGO clinical practice guidelines for acute kidney injury. Kidney Int Suppl 2012; 2:1.    The clinical guidelines noted below is only a system guideline, it does not replace the providers clinical judgment.    Provider, please specify the diagnosis or diagnoses associated with above clinical findings.      [ x   ]  Acute Renal Insufficiency  Consider if SCr rise is transient and normalizes quickly with no efforts at real resuscitation of vital signs and perfusion  [    ]  Other Acute Kidney Failure/Injury (please specify): ____________    [    ]  Unspecified Acute Kidney Failure/Injury     [    ]  Other (please specify): _______________________________  [    ]  Clinically Undetermined    Please document in your progress notes daily for the duration of treatment until resolved and include in your discharge summary.

## 2018-10-15 ENCOUNTER — HOSPITAL ENCOUNTER (EMERGENCY)
Facility: HOSPITAL | Age: 63
Discharge: HOME | End: 2018-10-15
Attending: EMERGENCY MEDICINE
Payer: MEDICAID

## 2018-10-15 VITALS
TEMPERATURE: 98 F | BODY MASS INDEX: 22.62 KG/M2 | WEIGHT: 158 LBS | OXYGEN SATURATION: 96 % | HEIGHT: 70 IN | DIASTOLIC BLOOD PRESSURE: 92 MMHG | HEART RATE: 82 BPM | SYSTOLIC BLOOD PRESSURE: 162 MMHG | RESPIRATION RATE: 16 BRPM

## 2018-10-15 DIAGNOSIS — F10.930 ALCOHOL WITHDRAWAL SYNDROME WITHOUT COMPLICATION: ICD-10-CM

## 2018-10-15 DIAGNOSIS — M79.641 RIGHT HAND PAIN: Primary | ICD-10-CM

## 2018-10-15 LAB
ALBUMIN SERPL BCP-MCNC: 3.7 G/DL
ALP SERPL-CCNC: 132 U/L
ALT SERPL W/O P-5'-P-CCNC: 40 U/L
AMPHET+METHAMPHET UR QL: NEGATIVE
ANION GAP SERPL CALC-SCNC: 12 MMOL/L
AST SERPL-CCNC: 68 U/L
BACTERIA #/AREA URNS AUTO: ABNORMAL /HPF
BARBITURATES UR QL SCN>200 NG/ML: NEGATIVE
BASOPHILS # BLD AUTO: 0.04 K/UL
BASOPHILS NFR BLD: 0.5 %
BENZODIAZ UR QL SCN>200 NG/ML: NORMAL
BILIRUB SERPL-MCNC: 0.4 MG/DL
BILIRUB UR QL STRIP: NEGATIVE
BUN SERPL-MCNC: 13 MG/DL
BZE UR QL SCN: NORMAL
CALCIUM SERPL-MCNC: 9.7 MG/DL
CANNABINOIDS UR QL SCN: NORMAL
CHLORIDE SERPL-SCNC: 105 MMOL/L
CLARITY UR REFRACT.AUTO: CLEAR
CO2 SERPL-SCNC: 24 MMOL/L
COLOR UR AUTO: YELLOW
CREAT SERPL-MCNC: 1.1 MG/DL
CREAT UR-MCNC: 358 MG/DL
DIFFERENTIAL METHOD: ABNORMAL
EOSINOPHIL # BLD AUTO: 0 K/UL
EOSINOPHIL NFR BLD: 0.5 %
ERYTHROCYTE [DISTWIDTH] IN BLOOD BY AUTOMATED COUNT: 14.7 %
EST. GFR  (AFRICAN AMERICAN): >60 ML/MIN/1.73 M^2
EST. GFR  (NON AFRICAN AMERICAN): >60 ML/MIN/1.73 M^2
ETHANOL SERPL-MCNC: 141 MG/DL
GLUCOSE SERPL-MCNC: 72 MG/DL
GLUCOSE UR QL STRIP: NEGATIVE
HCT VFR BLD AUTO: 36.2 %
HGB BLD-MCNC: 12.6 G/DL
HGB UR QL STRIP: NEGATIVE
HYALINE CASTS UR QL AUTO: 3 /LPF
IMM GRANULOCYTES # BLD AUTO: 0.02 K/UL
IMM GRANULOCYTES NFR BLD AUTO: 0.2 %
KETONES UR QL STRIP: NEGATIVE
LEUKOCYTE ESTERASE UR QL STRIP: NEGATIVE
LYMPHOCYTES # BLD AUTO: 0.9 K/UL
LYMPHOCYTES NFR BLD: 11.2 %
MCH RBC QN AUTO: 31.4 PG
MCHC RBC AUTO-ENTMCNC: 34.8 G/DL
MCV RBC AUTO: 90 FL
METHADONE UR QL SCN>300 NG/ML: NEGATIVE
MICROSCOPIC COMMENT: ABNORMAL
MONOCYTES # BLD AUTO: 0.8 K/UL
MONOCYTES NFR BLD: 9.5 %
NEUTROPHILS # BLD AUTO: 6.3 K/UL
NEUTROPHILS NFR BLD: 78.1 %
NITRITE UR QL STRIP: NEGATIVE
NRBC BLD-RTO: 0 /100 WBC
OPIATES UR QL SCN: NEGATIVE
PCP UR QL SCN>25 NG/ML: NEGATIVE
PH UR STRIP: 5 [PH] (ref 5–8)
PLATELET # BLD AUTO: 286 K/UL
PMV BLD AUTO: 9 FL
POTASSIUM SERPL-SCNC: 3.5 MMOL/L
PROT SERPL-MCNC: 7.8 G/DL
PROT UR QL STRIP: ABNORMAL
RBC # BLD AUTO: 4.01 M/UL
RBC #/AREA URNS AUTO: 0 /HPF (ref 0–4)
SODIUM SERPL-SCNC: 141 MMOL/L
SP GR UR STRIP: 1.02 (ref 1–1.03)
SQUAMOUS #/AREA URNS AUTO: 0 /HPF
TOXICOLOGY INFORMATION: NORMAL
TSH SERPL DL<=0.005 MIU/L-ACNC: 2.8 UIU/ML
URN SPEC COLLECT METH UR: ABNORMAL
UROBILINOGEN UR STRIP-ACNC: NEGATIVE EU/DL
WBC # BLD AUTO: 8.07 K/UL
WBC #/AREA URNS AUTO: 3 /HPF (ref 0–5)

## 2018-10-15 PROCEDURE — 99284 EMERGENCY DEPT VISIT MOD MDM: CPT | Mod: 25

## 2018-10-15 PROCEDURE — 99283 EMERGENCY DEPT VISIT LOW MDM: CPT | Mod: ,,, | Performed by: NURSE PRACTITIONER

## 2018-10-15 PROCEDURE — 80307 DRUG TEST PRSMV CHEM ANLYZR: CPT

## 2018-10-15 PROCEDURE — 80053 COMPREHEN METABOLIC PANEL: CPT

## 2018-10-15 PROCEDURE — 80320 DRUG SCREEN QUANTALCOHOLS: CPT

## 2018-10-15 PROCEDURE — 84443 ASSAY THYROID STIM HORMONE: CPT

## 2018-10-15 PROCEDURE — 25000003 PHARM REV CODE 250: Performed by: NURSE PRACTITIONER

## 2018-10-15 PROCEDURE — 85025 COMPLETE CBC W/AUTO DIFF WBC: CPT

## 2018-10-15 PROCEDURE — 81001 URINALYSIS AUTO W/SCOPE: CPT | Mod: 59

## 2018-10-15 RX ORDER — ACETAMINOPHEN 325 MG/1
650 TABLET ORAL
Status: DISCONTINUED | OUTPATIENT
Start: 2018-10-15 | End: 2018-10-15 | Stop reason: HOSPADM

## 2018-10-15 RX ORDER — ONDANSETRON 4 MG/1
4 TABLET, ORALLY DISINTEGRATING ORAL
Status: COMPLETED | OUTPATIENT
Start: 2018-10-15 | End: 2018-10-15

## 2018-10-15 RX ADMIN — ONDANSETRON HYDROCHLORIDE 4 MG: 4 TABLET, ORALLY DISINTEGRATING ORAL at 02:10

## 2018-10-15 NOTE — ED NOTES
Getting agitated , wanting to go outside smoking, moved to ems hallway for continued strict visual contact

## 2018-10-15 NOTE — ED PROVIDER NOTES
Encounter Date: 10/15/2018       History     Chief Complaint   Patient presents with    Suicidal     brought in by [facility] found a bunch of knives in house, told them want wanted to hurt people that come in my room    detox     wanting detox off pain killers and alcohol     Pt is a 62 y/o M with PMHx of depression, alcohol abuse, HTN and prior SI presents requesting detox services.  Pt's last drank was vodka 1 hr ago.  Since then, pt has become nauseated.  Pt is accompanied with a  from the [facility] ([facility]) for assistance getting into a detox rehab facility.   states he must be medically cleared and no tremors present before the facility will accept him.  He is also complaining of an injury to his right hand after punching a brick pole.  Denies SI, HI, AH, VH, SOB, CP, abdominal pain, vomiting and diarrhea.                Review of patient's allergies indicates:  No Known Allergies  Past Medical History:   Diagnosis Date    Behavioral problem     Depression     Fatigue     History of psychiatric care     History of psychiatric hospitalization     Hypertension     Self-harming behavior     Suicide attempt      Past Surgical History:   Procedure Laterality Date    ABDOMINAL SURGERY       Family History   Problem Relation Age of Onset    Alcohol abuse Brother     Drug abuse Brother      Social History     Tobacco Use    Smoking status: Current Every Day Smoker     Packs/day: 1.00     Types: Cigarettes    Smokeless tobacco: Never Used   Substance Use Topics    Alcohol use: Yes     Alcohol/week: 7.0 oz     Types: 14 Standard drinks or equivalent per week     Comment: 5th of vodka daily     Drug use: No     Review of Systems   Constitutional: Negative for fever.   HENT: Negative for sore throat.    Respiratory: Negative for shortness of breath.    Cardiovascular: Negative for chest pain.   Gastrointestinal: Negative for nausea.   Genitourinary: Negative  for dysuria.   Musculoskeletal: Positive for arthralgias and joint swelling. Negative for back pain.   Skin: Negative for rash.   Neurological: Negative for weakness.   Hematological: Does not bruise/bleed easily.   Psychiatric/Behavioral: Negative for hallucinations, self-injury and suicidal ideas.       Physical Exam     Initial Vitals [10/15/18 1316]   BP Pulse Resp Temp SpO2   (!) 180/100 100 18 98.3 °F (36.8 °C) 96 %      MAP       --         Physical Exam    Nursing note and vitals reviewed.  Constitutional: He appears well-developed and well-nourished.   Alert, sitting in bed in no distress or discomfort.    HENT:   Head: Atraumatic.   Eyes: EOM are normal.   Neck: Neck supple.   Cardiovascular: Normal rate and intact distal pulses.   Pulmonary/Chest: Breath sounds normal. He has no wheezes.   Abdominal: Soft. There is no tenderness.   Musculoskeletal: Normal range of motion.   Neurological: He is alert and oriented to person, place, and time.   Skin: Skin is warm.   Psychiatric: He has a normal mood and affect.         ED Course   Procedures  Labs Reviewed   CBC W/ AUTO DIFFERENTIAL - Abnormal; Notable for the following components:       Result Value    RBC 4.01 (*)     Hemoglobin 12.6 (*)     Hematocrit 36.2 (*)     MCH 31.4 (*)     RDW 14.7 (*)     MPV 9.0 (*)     Lymph # 0.9 (*)     Gran% 78.1 (*)     Lymph% 11.2 (*)     All other components within normal limits    Narrative:     green top tube shared 10/15/2018  14:59    COMPREHENSIVE METABOLIC PANEL - Abnormal; Notable for the following components:    AST 68 (*)     All other components within normal limits    Narrative:     green top tube shared 10/15/2018  14:59    URINALYSIS, REFLEX TO URINE CULTURE - Abnormal; Notable for the following components:    Protein, UA 2+ (*)     All other components within normal limits    Narrative:     Preferred Collection Type->Urine, Clean Catch  orange top cup received   ALCOHOL,MEDICAL (ETHANOL) - Abnormal; Notable  for the following components:    Alcohol, Medical, Serum 141 (*)     All other components within normal limits    Narrative:     green top tube shared 10/15/2018  14:59    URINALYSIS MICROSCOPIC - Abnormal; Notable for the following components:    Hyaline Casts, UA 3 (*)     All other components within normal limits    Narrative:     Preferred Collection Type->Urine, Clean Catch  orange top cup received   TSH    Narrative:     green top tube shared 10/15/2018  14:59    DRUG SCREEN PANEL, URINE EMERGENCY          Imaging Results          X-Ray Hand 3 View Right (Final result)  Result time 10/15/18 15:41:55   Procedure changed from X-Ray Hand 2 View Right     Final result by Jim Segura III, MD (10/15/18 15:41:55)                 Impression:      See above      Electronically signed by: Jim Segura MD  Date:    10/15/2018  Time:    15:41             Narrative:    EXAMINATION:  XR HAND COMPLETE 3 VIEW RIGHT    CLINICAL HISTORY:  right hand pain;  Pain in right hand    FINDINGS:  Right: There is DJD most severe at the 1st carpometacarpal joint.  No fracture dislocation bone destruction seen.                                       APC / Resident Notes:   Pt is a 64 y/o M presents to the ED today requesting assistance detoxing from alcohol.  My DDx includes but is not limited to alcohol intoxication, substance abuse, medication toxicity, or hand fracture.  Will get labs and xray of the hand.    4:16 PM  X-ray of hand showed no fracture.  I have attempted to contact the  at [phone].  No answer, will attempt again.  Labs pending.  He should be discharged after all labs are complete.      4:49 PM  I do not see an indication for further labs or imaging at this time, but have advised close follow up with his PCP and may require further treatment if condition continues.  Pt's labs are within normal limits and is medically cleared.  As the pt has no other complaints, I feel he is stable for  discharge.    I have discussed the care of this pt with my supervising MD.                                  Attending Attestation:   Physician Attestation Statement for Resident:  As the supervising MD   Physician Attestation Statement: I have personally seen and examined this patient.   I agree with the above history. -:   As the supervising MD I agree with the above PE.    As the supervising MD I agree with the above treatment, course, plan, and disposition.   -: 63-year-old male presents requesting alcohol detox.  He denies suicidal or homicidal ideation.  He hit his right hand prior to arrival.  Vitals unremarkable. X-ray shows no fracture of the right hand.  Contusion likely.  Treated with Tylenol.  Reviewed previous admission.  Patient has no history of seizures or DTs.  Labs unremarkable.  No evidence of acute intoxication.  Patient has capacity.  He is medically cleared for outpatient detox admission.  Will discharge home.  His  will assist with outpatient admission.  Did bedside teaching with return precautions.  All questions answered.  The patient acknowledges understanding.  Gave verbal discharge instructions.  I have reviewed and agree with the residents interpretation of the following: lab data and x-rays.  I have reviewed the following: old records at this facility.                       Clinical Impression:   The primary encounter diagnosis was Right hand pain. A diagnosis of Alcohol withdrawal syndrome without complication was also pertinent to this visit.      Disposition:   Disposition: Discharged  Condition: Stable                        Nate Victor III, NP  10/15/18 1701       Vicente Friedman MD  10/15/18 2883

## 2018-10-15 NOTE — DISCHARGE INSTRUCTIONS
I do not see an indication for further labs or imaging at this time, but have advised close follow up with his primary care physician and may require further treatment is condition continues.  Patients labs are within normal limits and medically cleared.  As the pt has no other complaints, I find he is stable for discharge.

## 2018-10-15 NOTE — ED TRIAGE NOTES
Pt. Presents to ED today with  and c/o alcohol withdrawal, substance abuse, and possible SI/HI. Pt. Had knives in home when being checked on at home, recently discharged from hospital with same complaint. Pt. Denies si/hi at this time, slurred speech noted, last drink this morning, vodka. Otherwise a&ox4, no acute distress noted.

## 2018-10-17 ENCOUNTER — OCCUPATIONAL HEALTH (OUTPATIENT)
Dept: URGENT CARE | Facility: CLINIC | Age: 63
End: 2018-10-17

## 2018-10-17 DIAGNOSIS — Z00.00 ANNUAL PHYSICAL EXAM: Primary | ICD-10-CM

## 2018-10-17 PROCEDURE — 71045 X-RAY EXAM CHEST 1 VIEW: CPT | Mod: S$GLB,,, | Performed by: RADIOLOGY

## 2019-02-14 NOTE — PLAN OF CARE
Problem: Fall Risk (Adult)  Goal: Identify Related Risk Factors and Signs and Symptoms  Related risk factors and signs and symptoms are identified upon initiation of Human Response Clinical Practice Guideline (CPG)  Outcome: Ongoing (interventions implemented as appropriate)   10/05/18 1524   Fall Risk   Related Risk Factors (Fall Risk) age-related changes;fatigue/slow reaction;polypharmacy       Problem: Patient Care Overview  Goal: Plan of Care Review  Outcome: Ongoing (interventions implemented as appropriate)  POC reviewed with patient; patient verbalizes understanding. Patient refuses to wear NC. Compliant with medication administration. Safety precautions in place; call light within reach, bed in low position, wheels locked, side rails up x2. Will continue to monitor.       1.89

## 2022-09-06 NOTE — ASSESSMENT & PLAN NOTE
-- Hx of SI 1 year ago.  -- Denies any SI/HI  -- Continued home zoloft 100 mg daily while inpatient, can continue this on d/c.     Medication:   Requested Prescriptions     Pending Prescriptions Disp Refills    hydrOXYzine HCl (ATARAX) 50 MG tablet [Pharmacy Med Name: hydrOXYzine HCL 50 MG TABLET] 30 tablet      Sig: TAKE ONE TABLET BY MOUTH EVERY NIGHT  AS NEEDED FOR ANXIETY OR SLEEP        Last Filled:  06.01.2022 #90    Patient Phone Number: [phone] (home)     Last appt: 6/1/2022   Next appt: 9/7/2022    Last OARRS: No flowsheet data found.

## 2023-03-21 NOTE — ED NOTES
Attempted to call [facility] with no answer. Pt. Okayed by ER physician to be discharged to waiting room to await ride home. No distress noted, steady gait. Denies questions or concerns regarding discharge instructions or fu.    forehead

## 2023-09-07 NOTE — ASSESSMENT & PLAN NOTE
-Ativan with CIWA >8, has not required benzos since 5am on 10/7  -Thiamin, folic acid multivitamin supplement daily while patient  -Mg repleted 10/6, Magnesium oxide 400 mg BID x2 doses  -plan to discharge today with 5mg of valium daily for 7 days  -Patient developed an BERENICE, however, after fluids his Cr normalized. Patient will be discharged on 10/8 with f/u with PCP and repeat BMP to check Cr in 1 week.          Blood pressure is well controlled on the current regimen.  Patient was advised to follow dietary salt restrictions.

## 2023-11-20 NOTE — PROGRESS NOTES
"Urine sample finally collected and sent to lab for analysis. Patient commented that, "they have cut back my meds". I reviewed w him what prns he has ordered. He does NOT meet the criteria for the prn lorazepam at this time. He seemed content w 2 Tylenol for neck pain and a sandwich.Will continue to monitor.  " Cosentyx Counseling:  I discussed with the patient the risks of Cosentyx including but not limited to worsening of Crohn's disease, immunosuppression, allergic reactions and infections.  The patient understands that monitoring is required including a PPD at baseline and must alert us or the primary physician if symptoms of infection or other concerning signs are noted.